# Patient Record
Sex: FEMALE | Race: WHITE | NOT HISPANIC OR LATINO | URBAN - METROPOLITAN AREA
[De-identification: names, ages, dates, MRNs, and addresses within clinical notes are randomized per-mention and may not be internally consistent; named-entity substitution may affect disease eponyms.]

---

## 2017-04-27 ENCOUNTER — TRANSFERRED RECORDS (OUTPATIENT)
Dept: HEALTH INFORMATION MANAGEMENT | Facility: CLINIC | Age: 77
End: 2017-04-27

## 2017-04-27 ENCOUNTER — MEDICAL CORRESPONDENCE (OUTPATIENT)
Dept: HEALTH INFORMATION MANAGEMENT | Facility: CLINIC | Age: 77
End: 2017-04-27

## 2017-10-10 ENCOUNTER — TRANSFERRED RECORDS (OUTPATIENT)
Dept: HEALTH INFORMATION MANAGEMENT | Facility: CLINIC | Age: 77
End: 2017-10-10

## 2017-12-14 ENCOUNTER — TRANSFERRED RECORDS (OUTPATIENT)
Dept: HEALTH INFORMATION MANAGEMENT | Facility: CLINIC | Age: 77
End: 2017-12-14

## 2017-12-22 ENCOUNTER — TRANSFERRED RECORDS (OUTPATIENT)
Dept: HEALTH INFORMATION MANAGEMENT | Facility: CLINIC | Age: 77
End: 2017-12-22

## 2018-01-16 ENCOUNTER — OFFICE VISIT (OUTPATIENT)
Dept: OPHTHALMOLOGY | Facility: CLINIC | Age: 78
End: 2018-01-16
Attending: OPHTHALMOLOGY
Payer: MEDICARE

## 2018-01-16 DIAGNOSIS — H35.3122 NONEXUDATIVE AGE-RELATED MACULAR DEGENERATION, LEFT EYE, INTERMEDIATE DRY STAGE: ICD-10-CM

## 2018-01-16 DIAGNOSIS — H04.123 BILATERAL DRY EYES: ICD-10-CM

## 2018-01-16 DIAGNOSIS — Z96.1 PSEUDOPHAKIA: ICD-10-CM

## 2018-01-16 DIAGNOSIS — H35.3290 EXUDATIVE AGE RELATED MACULAR DEGENERATION (H): Primary | ICD-10-CM

## 2018-01-16 PROCEDURE — 92134 CPTRZ OPH DX IMG PST SGM RTA: CPT | Mod: ZF | Performed by: OPHTHALMOLOGY

## 2018-01-16 PROCEDURE — G0463 HOSPITAL OUTPT CLINIC VISIT: HCPCS | Mod: ZF

## 2018-01-16 RX ORDER — ASCORBIC ACID 125 MG
5 TABLET,CHEWABLE ORAL
COMMUNITY

## 2018-01-16 RX ORDER — DIPHENOXYLATE HYDROCHLORIDE AND ATROPINE SULFATE 2.5; .025 MG/1; MG/1
1 TABLET ORAL
COMMUNITY
Start: 2012-01-26 | End: 2018-06-28

## 2018-01-16 RX ORDER — CEPHRADINE 500 MG
250 CAPSULE ORAL
COMMUNITY
End: 2018-10-23

## 2018-01-16 ASSESSMENT — SLIT LAMP EXAM - LIDS
COMMENTS: NORMAL
COMMENTS: NORMAL

## 2018-01-16 ASSESSMENT — VISUAL ACUITY
METHOD: SNELLEN - LINEAR
OS_SC: 20/30
OD_PH_SC: 20/25
OD_SC+: +2
OS_SC+: -1
OD_SC: 20/30

## 2018-01-16 ASSESSMENT — TONOMETRY
OD_IOP_MMHG: 15
IOP_METHOD: TONOPEN
OS_IOP_MMHG: 12

## 2018-01-16 ASSESSMENT — CONF VISUAL FIELD
OD_NORMAL: 1
OS_NORMAL: 1

## 2018-01-16 ASSESSMENT — EXTERNAL EXAM - LEFT EYE: OS_EXAM: NORMAL

## 2018-01-16 ASSESSMENT — EXTERNAL EXAM - RIGHT EYE: OD_EXAM: NORMAL

## 2018-01-16 NOTE — PROGRESS NOTES
Assessment & Plan      Aminta Cortés is a 78 year old female with the following diagnoses:   1. Exudative age related macular degeneration (H) - Right Eye    2. Nonexudative age-related macular degeneration, left eye, intermediate dry stage    3. Pseudophakia - Both Eyes    4. Bilateral dry eyes         S/P cataract extraction left eye (7/2017) and right eye (12/2017) with Dr. Gabriel at [place]  Right eye S/P multiple avastin (Q4W since June 2017) most recent 12/14/2017   Here to establish and transfer cares closer to home.  Very happy with vision improvement following cataract extraction.  Monthly injections are difficulty to continue in [place].  Concerned with possible systemic HTN secondary to repeat injections.    Discussed stable Exudative Age related macular degeneration right eye   Per review of outside records, plan was to start treat and extend  Would repeat OCT mac in 2 weeks and if still dry start Q6 week series x 3   Doing well following cataract extraction.  K edema nearly resolved  Recheck with refraction in 2 weeks  Continue artificial tears twice a day and as needed      Patient disposition:   Return in about 2 weeks (around 1/30/2018) for VT only, Refraction, OCT Macula.          Attending Physician Attestation:  Complete documentation of historical and exam elements from today's encounter can be found in the full encounter summary report (not reduplicated in this progress note).  I personally obtained the chief complaint(s) and history of present illness.  I confirmed and edited as necessary the review of systems, past medical/surgical history, family history, social history, and examination findings as documented by others; and I examined the patient myself.  I personally reviewed the relevant tests, images, and reports as documented above.  I formulated and edited as necessary the assessment and plan and discussed the findings and management plan with the patient and family. Attending Physician  Image/Tesing Attestation: I personally reviewed the ophthalmic test(s) associated with this encounter, agree with the interpretation(s) as documented by the resident/fellow, and have edited the corresponding report(s) as necessary. . - Tano Mcduffie MD

## 2018-01-16 NOTE — LETTER
1/16/2018       RE: Aminta Cortés  [address] [place] [address]     Dear Dr. Gabriel and Lorraine    I had the pleasure of seeing Ms. Aminta Cortés in the EYE CLINIC at [facility].  To help with the convenience of long term care, she is transferring her cares to our clinic which is closer to home.   Please see a copy of my visit note below.    Assessment & Plan      Aminta Cortés is a 78 year old female with the following diagnoses:   1. Exudative age related macular degeneration (H) - Right Eye    2. Nonexudative age-related macular degeneration, left eye, intermediate dry stage    3. Pseudophakia - Both Eyes    4. Bilateral dry eyes         S/P cataract extraction left eye (7/2017) and right eye (12/2017) with Dr. Gabriel at [place]  Right eye S/P multiple avastin (Q4W since June 2017) most recent 12/14/2017   Here to establish and transfer cares closer to home.  Very happy with vision improvement following cataract extraction.  Monthly injections are difficulty to continue in [place].  Concerned with possible systemic HTN secondary to repeat injections.    Discussed stable Exudative Age related macular degeneration right eye   Per review of outside records, plan was to start treat and extend  Would repeat OCT mac in 2 weeks and if still dry start Q6 week series x 3   Doing well following cataract extraction.  K edema nearly resolved  Recheck with refraction in 2 weeks  Continue artificial tears twice a day and as needed      Patient disposition:   Return in about 2 weeks (around 1/30/2018) for VT only, Refraction, OCT Macula.                  Base Eye Exam     Visual Acuity (Snellen - Linear)      Right Left   Dist sc 20/30 +2 20/30 -1   Dist ph sc 20/25 NI         Tonometry (Tonopen, 9:47 AM)      Right Left   Pressure 15 12         Pupils      Pupils APD   Right PERRL None   Left PERRL None         Visual Fields      Left Right   Result Full Full         Extraocular  Movement      Right Left   Result Full Full         Neuro/Psych     Oriented x3:  Yes    Mood/Affect:  Normal      Dilation     Both eyes:  1.0% Mydriacyl, 2.5% Shabbir Synephrine @ 9:47 AM            Slit Lamp and Fundus Exam     External Exam      Right Left    External Normal Normal      Slit Lamp Exam      Right Left    Lids/Lashes Normal Normal    Conjunctiva/Sclera White and quiet White and quiet    Cornea Clear Clear    Anterior Chamber Deep and quiet Deep and quiet    Iris Dilated Dilated, Iris atrophy    Lens Posterior capsular opacification, Posterior chamber intraocular lens Posterior capsular opacification, Posterior chamber intraocular lens    Vitreous Normal Normal      Fundus Exam      Right Left    Disc Normal Normal    Macula Soft drusen of multiple sizes, no heme, pigmentary changes Soft drusen of multiple sizes, no heme, pigmentary changes    Vessels Normal Normal    Periphery Normal Normal            Refraction     Wearing Rx     Currently using only +3.00 readers after CE/IOL      Manifest Refraction     Defer today - pt happy with readers currently  After dilation, pt's  stated they are due back to [place] at the end of the month for another injection and a final Rx after the most recently CE/IOL - wants to have done here                Again, thank you for allowing me to participate in the care of your patient.      Sincerely,    Tano Mcduffie MD

## 2018-01-16 NOTE — MR AVS SNAPSHOT
After Visit Summary   2018    Aminta Cortés    MRN: [id number]           Patient Information     Date Of Birth          1940        Visit Information        Provider Department      2018 9:00 AM Tano Mcduffie MD Eye Clinic        Today's Diagnoses     Macular degeneration    -  1       Follow-ups after your visit        Who to contact     Please call your clinic at [phone] to:    Ask questions about your health    Make or cancel appointments    Discuss your medicines    Learn about your test results    Speak to your doctor   If you have compliments or concerns about an experience at your clinic, or if you wish to file a complaint, please contact [facility] Patient Relations at [phone] or email us at [email or web address]         Additional Information About Your Visit        MyChart Information     Mimetas is an electronic gateway that provides easy, online access to your medical records. With Mimetas, you can request a clinic appointment, read your test results, renew a prescription or communicate with your care team.     To sign up for [employer] visit the website at [email or web address]   You will be asked to enter the access code listed below, as well as some personal information. Please follow the directions to create your username and password.     Your access code is: [email or web address]  Expires: 2018  6:30 AM     Your access code will  in 90 days. If you need help or a new code, please contact your [facility] or call [phone] for assistance.        Care EveryWhere ID     This is your Care EveryWhere ID. This could be used by other organizations to access your [place] medical records  [id number]         Blood Pressure from Last 3 Encounters:   No data found for BP    Weight from Last 3 Encounters:   No data found for Wt              We Performed the Following     OCT Retina  Spectralis OU (both eyes)        Primary Care Provider Office Phone # Fax #    Cindi Delacruz [phone]       [facility] [address]  [facility] [address]        Equal Access to Services     DREW SAAVEDRA : Jamal carpenter gabby Sobarry, waaxda luqadaha, qaybta kaalmada adejosie, faina cardenas laMaria Estherkael graf. So [facility] [phone].    ATENCIÓN: Si habla español, tiene a engle disposición servicios gratuitos de asistencia lingüística. Llame al [phone].    We comply with applicable federal civil rights laws and [place] laws. We do not discriminate on the basis of race, color, national origin, age, disability, sex, sexual orientation, or gender identity.            Thank you!     Thank you for choosing EYE CLINIC  for your care. Our goal is always to provide you with excellent care. Hearing back from our patients is one way we can continue to improve our services. Please take a few minutes to complete the written survey that you may receive in the mail after your visit with us. Thank you!             Your Updated Medication List - Protect others around you: Learn how to safely use, store and throw away your medicines at www.disposemymeds.org.          This list is accurate as of: 1/16/18 10:15 AM.  Always use your most recent med list.                   Brand Name Dispense Instructions for use Diagnosis    Alpha-Lipoic Acid 200 MG Caps      250 mg        Bilberry 100 MG Caps      Take 100 mg by mouth        CALCIUM PO      Take 500 mg by mouth        Coenzyme Q10-Vitamin E 100-100 MG-UNIT Caps      Take 1 capsule by mouth        * vitamin D3 1000 UNITS Caps      2,500 Units        * D 1000 1000 UNITS Chew   Generic drug:  cholecalciferol      Take 1 tablet by mouth        MAGNESIUM PO      Take 500 mg by mouth        Magnesium Sulfate 70 MG Caps      125 mg        MULTI-VITAMINS Tabs      Take 1 tablet by mouth 3 or 4 times a week        OCUVITE PO       Take 1 tablet by mouth daily Since May 2017        Quercetin 50 MG Tabs      800 mg        Resveratrol 50 MG Caps      200 mg        UNABLE TO FIND      Indications: L-Carnosine -- 500 mg        VITAMIN B COMPLEX PO      Take 1 capsule by mouth        Vitamin K2 100 MCG Caps      5 mg        Zinc Methionate 50 MG Caps      Take 50 mg by mouth        * Notice:  This list has 2 medication(s) that are the same as other medications prescribed for you. Read the directions carefully, and ask your doctor or other care provider to review them with you.

## 2018-01-16 NOTE — NURSING NOTE
"Chief Complaints and History of Present Illnesses   Patient presents with     Consult For     wet macular degeneration in the right eye, dry macular degeneration in the left eye     HPI    Affected eye(s):  Both   Symptoms:     No decreased vision   Floaters (Comment: just a couple were noted post-cataract surgery)   No flashes   No Dryness      Duration:  9 months   Frequency:  Constant       Do you have eye pain now?:  No      Comments:  Pt reports that she had been treated at [place] for monthly injections of Avastin in her right eye - would like to receive these closer to home, if they are needed - 2nd opinion whether they are necessary, as she is happy with her vision as it  Pt had cataract surgery in both eyes last year (left eye in May, right eye in December)  Pt states she is still in awe of her vision since the cataract surgery was just recently done (states the cataracts were pretty dense as she had family reasons for which she had postponed the surgery previously)  Pt believes her eyes have been dry for about 20 years but states she \"doesn't have a calibration on that\" and isn't bothered by dryness today  Pt states the waviness on the Amsler grid shows on the left eye (not on the right eye)    Pt is on the last week or so of her POP drops in the right eye  Pt only occasionally uses AT's    Jacy Munguia COA 9:17 AM January 16, 2018              "

## 2018-01-22 DIAGNOSIS — H35.3211 EXUDATIVE AGE-RELATED MACULAR DEGENERATION OF RIGHT EYE WITH ACTIVE CHOROIDAL NEOVASCULARIZATION (H): ICD-10-CM

## 2018-01-22 DIAGNOSIS — H35.3290 EXUDATIVE SENILE MACULAR DEGENERATION OF RETINA (H): Primary | ICD-10-CM

## 2018-01-30 ENCOUNTER — OFFICE VISIT (OUTPATIENT)
Dept: OPHTHALMOLOGY | Facility: CLINIC | Age: 78
End: 2018-01-30
Attending: OPHTHALMOLOGY
Payer: MEDICARE

## 2018-01-30 DIAGNOSIS — H35.3290 EXUDATIVE SENILE MACULAR DEGENERATION OF RETINA (H): Primary | ICD-10-CM

## 2018-01-30 DIAGNOSIS — H35.3190 NONEXUDATIVE SENILE MACULAR DEGENERATION OF RETINA: ICD-10-CM

## 2018-01-30 PROCEDURE — G0463 HOSPITAL OUTPT CLINIC VISIT: HCPCS | Mod: ZF

## 2018-01-30 PROCEDURE — C9257 BEVACIZUMAB INJECTION: HCPCS | Mod: ZF | Performed by: OPHTHALMOLOGY

## 2018-01-30 PROCEDURE — 67028 INJECTION EYE DRUG: CPT | Mod: RT,ZF | Performed by: OPHTHALMOLOGY

## 2018-01-30 PROCEDURE — 92015 DETERMINE REFRACTIVE STATE: CPT | Mod: GY,ZF

## 2018-01-30 PROCEDURE — 25000128 H RX IP 250 OP 636: Mod: ZF | Performed by: OPHTHALMOLOGY

## 2018-01-30 PROCEDURE — 92134 CPTRZ OPH DX IMG PST SGM RTA: CPT | Mod: ZF | Performed by: OPHTHALMOLOGY

## 2018-01-30 RX ADMIN — Medication 1.25 MG: at 12:15

## 2018-01-30 ASSESSMENT — VISUAL ACUITY
OS_SC+: -2
OS_SC: 20/30
METHOD: SNELLEN - LINEAR
OD_SC: 20/25

## 2018-01-30 ASSESSMENT — CONF VISUAL FIELD
OD_NORMAL: 1
METHOD: COUNTING FINGERS
OS_NORMAL: 1

## 2018-01-30 ASSESSMENT — TONOMETRY
IOP_METHOD: TONOPEN
OS_IOP_MMHG: 15
OD_IOP_MMHG: 14

## 2018-01-30 ASSESSMENT — REFRACTION_MANIFEST
OS_AXIS: 165
OD_AXIS: 005
OD_CYLINDER: +0.75
OS_CYLINDER: +0.50
OS_ADD: +2.75
OD_SPHERE: -0.50
OS_SPHERE: PLANO
OD_ADD: +2.75

## 2018-01-30 NOTE — NURSING NOTE
Chief Complaints and History of Present Illnesses   Patient presents with     Follow Up For     Exudative age related macular degeneration      HPI    Affected eye(s):  Both   Symptoms:        Frequency:  Constant       Do you have eye pain now?:  No      Comments:  States va is the same since last visit  +dry   Decreased floaters  Celia HILLMAN 10:58 AM January 30, 2018

## 2018-01-30 NOTE — MR AVS SNAPSHOT
After Visit Summary   1/30/2018    Aminta Cortés    MRN: [id number]           Patient Information     Date Of Birth          1940        Visit Information        Provider Department      1/30/2018 10:30 AM Tano Mcduffei MD Eye Clinic        Today's Diagnoses     Exudative senile macular degeneration of retina (H)    -  1    Nonexudative senile macular degeneration of retina           Follow-ups after your visit        Your next 10 appointments already scheduled     Mar 13, 2018 10:30 AM CDT   RETURN GENERAL with Tano Mcduffie MD   Eye Clinic ([facility])    [address]  [address]  [address]  [facility] [address]   [phone]              Who to contact     Please call your clinic at [phone] to:    Ask questions about your health    Make or cancel appointments    Discuss your medicines    Learn about your test results    Speak to your doctor   If you have compliments or concerns about an experience at your clinic, or if you wish to file a complaint, please contact [facility] Patient Relations at [phone] or email us at [email or web address]         Additional Information About Your Visit        MyChart Information     ImageShackt gives you secure access to your electronic health record. If you see a primary care provider, you can also send messages to your care team and make appointments. If you have questions, please call your primary care clinic.  If you do not have a primary care provider, please call [phone] and they will assist you.      InfoAssure is an electronic gateway that provides easy, online access to your medical records. With InfoAssure, you can request a clinic appointment, read your test results, renew a prescription or communicate with your care team.     To access your existing account, please contact your [facility] or call [phone] for  assistance.        Care EveryWhere ID     This is your Care EveryWhere ID. This could be used by other organizations to access your [place] medical records  [id number]         Blood Pressure from Last 3 Encounters:   No data found for BP    Weight from Last 3 Encounters:   No data found for Wt              We Performed the Following     OCT Retina Spectralis OU (both eyes)        Primary Care Provider Office Phone # Fax #    Cindi Delacruz [phone]       [facility] [address]  [facility] [address]        Equal Access to Services     DREW SAAVEDRA : Hadii aad ku hadasho Soomaali, waaxda luqadaha, qaybta kaalmada adeegyada, waxay idiin hayaan adeeg ronald greer . So [facility] [phone].    ATENCIÓN: Si habla español, tiene a engle disposición servicios gratuitos de asistencia lingüística. Llame al [phone].    We comply with applicable federal civil rights laws and [place] laws. We do not discriminate on the basis of race, color, national origin, age, disability, sex, sexual orientation, or gender identity.            Thank you!     Thank you for choosing EYE CLINIC  for your care. Our goal is always to provide you with excellent care. Hearing back from our patients is one way we can continue to improve our services. Please take a few minutes to complete the written survey that you may receive in the mail after your visit with us. Thank you!             Your Updated Medication List - Protect others around you: Learn how to safely use, store and throw away your medicines at www.disposemymeds.org.          This list is accurate as of 1/30/18 12:17 PM.  Always use your most recent med list.                   Brand Name Dispense Instructions for use Diagnosis    Alpha-Lipoic Acid 200 MG Caps      250 mg        Bilberry 100 MG Caps      Take 100 mg by mouth        CALCIUM PO      Take 500 mg by mouth        Coenzyme Q10-Vitamin E 100-100 MG-UNIT Caps      Take 1  capsule by mouth        * vitamin D3 1000 UNITS Caps      2,500 Units        * D 1000 1000 UNITS Chew   Generic drug:  cholecalciferol      Take 1 tablet by mouth        MAGNESIUM PO      Take 500 mg by mouth        Magnesium Sulfate 70 MG Caps      125 mg        MULTI-VITAMINS Tabs      Take 1 tablet by mouth 3 or 4 times a week        OCUVITE PO      Take 1 tablet by mouth daily Since May 2017        Quercetin 50 MG Tabs      800 mg        Resveratrol 50 MG Caps      200 mg        UNABLE TO FIND      Indications: L-Carnosine -- 500 mg        VITAMIN B COMPLEX PO      Take 1 capsule by mouth        Vitamin K2 100 MCG Caps      5 mg        Zinc Methionate 50 MG Caps      Take 50 mg by mouth        * Notice:  This list has 2 medication(s) that are the same as other medications prescribed for you. Read the directions carefully, and ask your doctor or other care provider to review them with you.

## 2018-01-30 NOTE — PROGRESS NOTES
Assessment & Plan      Aminta Cortés is a 78 year old female with the following diagnoses:   1. Exudative senile macular degeneration of retina (H)    2. Nonexudative senile macular degeneration of retina         S/P cataract extraction left eye (7/2017) and right eye (12/2017) with Dr. Gabriel at [place]  Right eye S/P multiple avastin (Q4W since June 2017) most recent 12/14/2017   Repeat OCT today (6 weeks from previous injection) has a very subtle increase in intraretinal fluid of the right eye.  Left eye stable and dry    Here to establish and transfer cares closer to home.  Very happy with vision improvement following cataract extraction.  Monthly injections are difficulty to continue in [place].  Concerned with possible systemic HTN secondary to repeat injections.    Discussed stable Exudative Age related macular degeneration right eye   RBA to repeat avastin injection today, right eye.  Plan to see back in 6 weeks with repeat OCT and likely injection.  Injection #1/3 today  Ok to update glasses following cataract extraction; prescription provided today    Patient disposition:   Return in about 6 weeks (around 3/13/2018) for VT only, OCT Macula.          Attending Physician Attestation:  Complete documentation of historical and exam elements from today's encounter can be found in the full encounter summary report (not reduplicated in this progress note).  I personally obtained the chief complaint(s) and history of present illness.  I confirmed and edited as necessary the review of systems, past medical/surgical history, family history, social history, and examination findings as documented by others; and I examined the patient myself.  I personally reviewed the relevant tests, images, and reports as documented above.  I formulated and edited as necessary the assessment and plan and discussed the findings and management plan with the patient and family. Attending Physician Procedure Attestation: I was present  for the entire procedure        . - Tano Mcduffie MD

## 2018-03-13 ENCOUNTER — OFFICE VISIT (OUTPATIENT)
Dept: OPHTHALMOLOGY | Facility: CLINIC | Age: 78
End: 2018-03-13
Attending: OPHTHALMOLOGY
Payer: MEDICARE

## 2018-03-13 ENCOUNTER — TELEPHONE (OUTPATIENT)
Dept: OPHTHALMOLOGY | Facility: CLINIC | Age: 78
End: 2018-03-13

## 2018-03-13 DIAGNOSIS — Z96.1 PSEUDOPHAKIA OF BOTH EYES: Primary | ICD-10-CM

## 2018-03-13 DIAGNOSIS — H35.3231 EXUDATIVE AGE-RELATED MACULAR DEGENERATION OF BOTH EYES WITH ACTIVE CHOROIDAL NEOVASCULARIZATION (H): ICD-10-CM

## 2018-03-13 DIAGNOSIS — H35.3290 EXUDATIVE SENILE MACULAR DEGENERATION OF RETINA (H): Primary | ICD-10-CM

## 2018-03-13 PROCEDURE — 67028 INJECTION EYE DRUG: CPT | Mod: RT,ZF | Performed by: OPHTHALMOLOGY

## 2018-03-13 PROCEDURE — 92134 CPTRZ OPH DX IMG PST SGM RTA: CPT | Mod: ZF | Performed by: OPHTHALMOLOGY

## 2018-03-13 PROCEDURE — G0463 HOSPITAL OUTPT CLINIC VISIT: HCPCS | Mod: 25,ZF

## 2018-03-13 PROCEDURE — 25000128 H RX IP 250 OP 636: Mod: ZF | Performed by: OPHTHALMOLOGY

## 2018-03-13 PROCEDURE — C9257 BEVACIZUMAB INJECTION: HCPCS | Mod: ZF | Performed by: OPHTHALMOLOGY

## 2018-03-13 RX ADMIN — Medication 1.25 MG: at 12:15

## 2018-03-13 ASSESSMENT — VISUAL ACUITY
CORRECTION_TYPE: GLASSES
METHOD: SNELLEN - LINEAR
OS_CC+: -2
OD_CC: 20/25
OS_CC: 20/30

## 2018-03-13 ASSESSMENT — REFRACTION_WEARINGRX
OS_ADD: +2.75
OD_ADD: +2.75
OS_CYLINDER: +0.50
OD_AXIS: 005
OS_AXIS: 165
OD_SPHERE: -0.50
OS_SPHERE: PLANO
OD_CYLINDER: +0.75

## 2018-03-13 ASSESSMENT — TONOMETRY
OD_IOP_MMHG: 11
IOP_METHOD: ICARE
OS_IOP_MMHG: 11

## 2018-03-13 ASSESSMENT — CONF VISUAL FIELD
OD_NORMAL: 1
OS_NORMAL: 1

## 2018-03-13 ASSESSMENT — EXTERNAL EXAM - RIGHT EYE: OD_EXAM: NORMAL

## 2018-03-13 ASSESSMENT — EXTERNAL EXAM - LEFT EYE: OS_EXAM: NORMAL

## 2018-03-13 ASSESSMENT — SLIT LAMP EXAM - LIDS
COMMENTS: NORMAL
COMMENTS: NORMAL

## 2018-03-13 NOTE — NURSING NOTE
Chief Complaints and History of Present Illnesses   Patient presents with     Follow Up For     Exudative senile macular degeneration of retina      HPI    Affected eye(s):  Both   Symptoms:        Duration:  6 weeks   Frequency:  Constant       Do you have eye pain now?:  No      Comments:  Pt. States that she is doing well,  No change in VA BE.  Does have a lot of itching BE.  Joselyn Centeno COT 11:00 AM March 13, 2018

## 2018-03-13 NOTE — PROGRESS NOTES
Patient here for Wet Age related macular degeneration OD and dry Age related macular degeneration OS f/u. Receiving injections OD with treat and extend model.     At last visit also was given updated MRx and patient happy with progressives but having some more difficulty with reading.    Assessment & Plan      Aminta Cortés is a 78 year old female with the following diagnoses:   1. Pseudophakia of both eyes - Both Eyes    2. 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 Gabriel at [place]  Right eye S/P multiple avastin (Q4W since June 2017) at [place]. She transferred her care to us due to easier transportation.     S/p 1 avastin injection last visit 1/30. #1/3 with goal to treat and extend.   6 week visit today with stable OCT right eye  Continue planned 6 week injection series (#2 today)     RBA to repeat avastin injection today, right eye.  Plan to see back in 6 weeks with repeat OCT and likely injection.      Dry Eyes  - Complains of mild pain as she reads.   - Artificial tears QID      Patient disposition:   Return in about 6 weeks (around 4/24/2018) for Follow Up, OCT Macula. and injection (Avastin)         Pa Beltrán M.D.  PGY-2, Ophthalmology    Attending Physician Attestation:  Complete documentation of historical and exam elements from today's encounter can be found in the full encounter summary report (not reduplicated in this progress note).  I personally obtained the chief complaint(s) and history of present illness.  I confirmed and edited as necessary the review of systems, past medical/surgical history, family history, social history, and examination findings as documented by others; and I examined the patient myself.  I personally reviewed the relevant tests, images, and reports as documented above.  I  formulated and edited as necessary the assessment and plan and discussed the findings and management plan with the patient and family.Attending Physician Image/Tesing Attestation: I personally reviewed the ophthalmic test(s) associated with this encounter, agree with the interpretation(s) as documented by the resident/fellow, and have edited the corresponding report(s) as necessary.   Attending Physician Procedure Attestation: I was present for the entire procedure     . - Tano Mcduffie MD

## 2018-03-13 NOTE — MR AVS SNAPSHOT
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      Your next 10 appointments already scheduled     May 01, 2018 10:00 AM CDT   RETURN GENERAL with Tano Mcduffie MD   Eye Clinic ([facility])    [address] [id number] [phone]              Who to contact     Please call your clinic at [phone] to:    Ask questions about your health    Make or cancel appointments    Discuss your medicines    Learn about your test results    Speak to your doctor            Additional Information About Your Visit        MyChart Information     [employer] gives you secure access to your electronic health record. If you see a primary care provider, you can also send messages to your care team and make appointments. If you have questions, please call your primary care clinic.  If you do not have a primary care provider, please call [phone] and they will assist you.      [employer] is an electronic gateway that provides easy, online access to your medical records. With [employer], you can request a clinic appointment, read your test results, renew a prescription or communicate with your care team.     To access your existing account, please contact your [facility] or call [phone] for assistance.        Care EveryWhere ID     This is your Care EveryWhere ID. This  could be used by other organizations to access your [place] medical records  [id number]         Blood Pressure from Last 3 Encounters:   No data found for BP    Weight from Last 3 Encounters:   No data found for Wt              We Performed the Following     OCT Retina Spectralis OU (both eyes)        Primary Care Provider Office Phone # Fax #    Cindi Delacruz [phone]       [facility] [address]  [facility] [address]        Equal Access to Services     DREW SAAVEDRA : Hadii aad ku hadasho Soomaali, waaxda luqadaha, qaybta kaalmada adeegyada, waxay idiin hayaan adeeg kharash la'aan . So [facility] [phone].    ATENCIÓN: Si habla español, tiene a engle disposición servicios gratuitos de asistencia lingüística. [facility] [phone].    We comply with applicable federal civil rights laws and [place] laws. We do not discriminate on the basis of race, color, national origin, age, disability, sex, sexual orientation, or gender identity.            Thank you!     Thank you for choosing EYE CLINIC  for your care. Our goal is always to provide you with excellent care. Hearing back from our patients is one way we can continue to improve our services. Please take a few minutes to complete the written survey that you may receive in the mail after your visit with us. Thank you!             Your Updated Medication List - Protect others around you: Learn how to safely use, store and throw away your medicines at www.disposemymeds.org.          This list is accurate as of 3/13/18 12:45 PM.  Always use your most recent med list.                   Brand Name Dispense Instructions for use Diagnosis    Alpha-Lipoic Acid 200 MG Caps      250 mg        Bilberry 100 MG Caps      Take 100 mg by mouth        CALCIUM PO      Take 500 mg by mouth        Coenzyme Q10-Vitamin E 100-100 MG-UNIT Caps      Take 1 capsule by mouth        * vitamin D3 1000 UNITS Caps      2,500 Units        * D 1000  1000 UNITS Chew   Generic drug:  cholecalciferol      Take 1 tablet by mouth        MAGNESIUM PO      Take 500 mg by mouth        Magnesium Sulfate 70 MG Caps      125 mg        MULTI-VITAMINS Tabs      Take 1 tablet by mouth 3 or 4 times a week        OCUVITE PO      Take 1 tablet by mouth daily Since May 2017        Quercetin 50 MG Tabs      800 mg        Resveratrol 50 MG Caps      200 mg        UNABLE TO FIND      Indications: L-Carnosine -- 500 mg        VITAMIN B COMPLEX PO      Take 1 capsule by mouth        Vitamin K2 100 MCG Caps      5 mg        Zinc Methionate 50 MG Caps      Take 50 mg by mouth        * Notice:  This list has 2 medication(s) that are the same as other medications prescribed for you. Read the directions carefully, and ask your doctor or other care provider to review them with you.

## 2018-05-01 ENCOUNTER — OFFICE VISIT (OUTPATIENT)
Dept: OPHTHALMOLOGY | Facility: CLINIC | Age: 78
End: 2018-05-01
Attending: OPHTHALMOLOGY
Payer: MEDICARE

## 2018-05-01 DIAGNOSIS — H35.3211 EXUDATIVE AGE-RELATED MACULAR DEGENERATION OF RIGHT EYE WITH ACTIVE CHOROIDAL NEOVASCULARIZATION (H): Primary | ICD-10-CM

## 2018-05-01 DIAGNOSIS — Z96.1 PSEUDOPHAKIA OF BOTH EYES: ICD-10-CM

## 2018-05-01 DIAGNOSIS — H04.123 BILATERAL DRY EYES: ICD-10-CM

## 2018-05-01 DIAGNOSIS — H35.3190 NONEXUDATIVE SENILE MACULAR DEGENERATION OF RETINA: ICD-10-CM

## 2018-05-01 PROCEDURE — 92134 CPTRZ OPH DX IMG PST SGM RTA: CPT | Mod: ZF | Performed by: OPHTHALMOLOGY

## 2018-05-01 PROCEDURE — 67028 INJECTION EYE DRUG: CPT | Mod: ZF,RT | Performed by: OPHTHALMOLOGY

## 2018-05-01 PROCEDURE — G0463 HOSPITAL OUTPT CLINIC VISIT: HCPCS | Mod: ZF

## 2018-05-01 PROCEDURE — 92015 DETERMINE REFRACTIVE STATE: CPT | Mod: GY,ZF

## 2018-05-01 PROCEDURE — 25000128 H RX IP 250 OP 636: Mod: ZF | Performed by: OPHTHALMOLOGY

## 2018-05-01 PROCEDURE — C9257 BEVACIZUMAB INJECTION: HCPCS | Mod: ZF | Performed by: OPHTHALMOLOGY

## 2018-05-01 RX ADMIN — Medication 1.25 MG: at 11:18

## 2018-05-01 ASSESSMENT — VISUAL ACUITY
OS_PH_CC: 20/50
OD_CC: 20/25
OD_CC+: -1
METHOD: SNELLEN - LINEAR
OS_CC: 20/60
CORRECTION_TYPE: GLASSES

## 2018-05-01 ASSESSMENT — SLIT LAMP EXAM - LIDS
COMMENTS: NORMAL
COMMENTS: NORMAL

## 2018-05-01 ASSESSMENT — REFRACTION_MANIFEST
OS_CYLINDER: +0.75
OD_CYLINDER: +0.75
OD_AXIS: 005
OD_SPHERE: -0.75
OS_ADD: +2.50
OS_AXIS: 005
OS_SPHERE: -0.50
OD_ADD: +2.50

## 2018-05-01 ASSESSMENT — EXTERNAL EXAM - LEFT EYE: OS_EXAM: NORMAL

## 2018-05-01 ASSESSMENT — CONF VISUAL FIELD
OD_NORMAL: 1
OS_NORMAL: 1

## 2018-05-01 ASSESSMENT — EXTERNAL EXAM - RIGHT EYE: OD_EXAM: NORMAL

## 2018-05-01 ASSESSMENT — TONOMETRY
OS_IOP_MMHG: 12
IOP_METHOD: TONOPEN
OD_IOP_MMHG: 15

## 2018-05-01 ASSESSMENT — REFRACTION_WEARINGRX
OD_ADD: +2.75
OS_ADD: +2.75
OD_CYLINDER: +0.75
OD_SPHERE: -0.50
OS_AXIS: 165
OD_AXIS: 005
OS_SPHERE: +0.00
OS_CYLINDER: +0.50

## 2018-05-01 NOTE — PROGRESS NOTES
Patient here for Wet Age related macular degeneration OD and dry Age related macular degeneration OS f/u. Receiving injections OD with treat and extend model.  Left eye becoming worse.  Difficulty with reading      Assessment & Plan      Aminta Cortés is a 78 year old female with the following diagnoses:   1. 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 Bilateral dry eyes       NVAMD OD, Dry Age related macular degeneration OS  S/P cataract extraction left eye (7/2017) and right eye (12/2017) with Dr. Gabriel at [place]  Right eye S/P multiple avastin (Q4W since June 2017) at [place]. She transferred her care to us due to easier transportation.     S/p 2 avastin injection last visit 1/30. #2/3 with goal to treat and extend.   6 week visit today with stable OCT right eye  Continue planned 6 week injection series (#3 today)     RBA to repeat avastin injection today, right eye.  Plan to see back in 8 weeks with repeat OCT and likely injection.      Dry Eyes  - Complains of mild pain as she reads.   - Artificial tears four times a day   -Recommend low vision consult with Dr. Sandvoal      Patient disposition:   Return in about 2 months (around 7/1/2018) for OCT Macula. and injection (Avastin)       Attending Physician Attestation:  Complete documentation of historical and exam elements from today's encounter can be found in the full encounter summary report (not reduplicated in this progress note).  I personally obtained the chief complaint(s) and history of present illness.  I confirmed and edited as necessary the review of systems, past medical/surgical history, family history, social history, and examination findings as documented by others; and I examined the patient myself.  I personally reviewed the relevant tests, images, and reports as documented  above.  I formulated and edited as necessary the assessment and plan and discussed the findings and management plan with the patient and family.Attending Physician Procedure Attestation: I was present for the entire procedure      - Tano Mcduffie MD

## 2018-05-01 NOTE — MR AVS SNAPSHOT
After Visit Summary   5/1/2018    Aminta Cortés    MRN: [id number]           Patient Information     Date Of Birth          1940        Visit Information        Provider Department      5/1/2018 10:00 AM Tano Mcduffie MD [facility]        Today's Diagnoses     Exudative senile macular degeneration of retina (H)    -  1    Exudative age-related macular degeneration of right eye with active choroidal neovascularization (H)            Follow-ups after your visit        Follow-up notes from your care team     Return in about 2 months (around 7/1/2018) for OCT Macula.      Your next 10 appointments already scheduled     May 09, 2018 10:20 AM CDT   (Arrive by 10:05 AM)   New Low Vision with Joe Sandoval [facility] Ophthalmology ([facility] [place])    [address]  [address] [facility] [address] [phone]            Jul 26, 2018  9:45 AM CDT   RETURN GENERAL with Tano Mcduffie MD   Eye Clinic ([facility])    [address] [address] [phone]              Who to contact     Please call your clinic at [phone] to:    Ask questions about your health    Make or cancel appointments    Discuss your medicines    Learn about your test results    Speak to your doctor            Additional Information About Your Visit        MyChart Information     [employer] gives you secure access to your electronic health record. If you see a primary care provider, you can also send messages to your care team and make appointments. If you have questions, please call your primary care clinic.  If you do not have a primary care provider, please call [phone] and they will assist you.      [employer] is an electronic gateway that provides easy, online access to your medical records. With [employer], you can request a clinic appointment, read your test results, renew a prescription or  communicate with your care team.     To access your existing account, please contact your [facility] or call [phone] for assistance.        Care EveryWhere ID     This is your Care EveryWhere ID. This could be used by other organizations to access your [place] medical records  [id number]         Blood Pressure from Last 3 Encounters:   No data found for BP    Weight from Last 3 Encounters:   No data found for Wt              We Performed the Following     OCT Retina Spectralis OU (both eyes)        Primary Care Provider Office Phone # Fax #    Cindi Delacruz [phone]       [facility] [address]  [facility] [address]        Equal Access to Services     DREW SAAVEDRA : Hadii aad ku hadasho Soomaali, waaxda luqadaha, qaybta kaalmada adeegyada, waxay emaniin keshan jovita greer . So [facility] [phone].    ATENCIÓN: Si habla español, tiene a engle disposición servicios gratuitos de asistencia lingüística. Llame al [phone].    We comply with applicable federal civil rights laws and [place] laws. We do not discriminate on the basis of race, color, national origin, age, disability, sex, sexual orientation, or gender identity.            Thank you!     Thank you for choosing EYE CLINIC  for your care. Our goal is always to provide you with excellent care. Hearing back from our patients is one way we can continue to improve our services. Please take a few minutes to complete the written survey that you may receive in the mail after your visit with us. Thank you!             Your Updated Medication List - Protect others around you: Learn how to safely use, store and throw away your medicines at www.disposemymeds.org.          This list is accurate as of 5/1/18 11:48 AM.  Always use your most recent med list.                   Brand Name Dispense Instructions for use Diagnosis    Alpha-Lipoic Acid 200 MG Caps      250 mg         Bilberry 100 MG Caps      Take 100 mg by mouth        CALCIUM PO      Take 500 mg by mouth        Coenzyme Q10-Vitamin E 100-100 MG-UNIT Caps      Take 1 capsule by mouth        * vitamin D3 1000 units Caps      2,500 Units        * D 1000 1000 units Chew   Generic drug:  cholecalciferol      Take 1 tablet by mouth        MAGNESIUM PO      Take 500 mg by mouth        Magnesium Sulfate 70 MG Caps      125 mg        MULTI-VITAMINS Tabs      Take 1 tablet by mouth 3 or 4 times a week        OCUVITE PO      Take 1 tablet by mouth daily Since May 2017        Quercetin 50 MG Tabs      800 mg        Resveratrol 50 MG Caps      200 mg        UNABLE TO FIND      Indications: L-Carnosine -- 500 mg        VITAMIN B COMPLEX PO      Take 1 capsule by mouth        Vitamin K2 100 MCG Caps      5 mg        Zinc Methionate 50 MG Caps      Take 50 mg by mouth        * Notice:  This list has 2 medication(s) that are the same as other medications prescribed for you. Read the directions carefully, and ask your doctor or other care provider to review them with you.

## 2018-05-01 NOTE — NURSING NOTE
Chief Complaints and History of Present Illnesses   Patient presents with     Macular Degeneration Follow Up     HPI    Last Eye Exam:  3/13/18   Affected eye(s):  Both   Symptoms:     Blurred vision   Decreased vision (Comment: notice while reading, LE. )      Duration:  6 weeks   Frequency:  Constant       Do you have eye pain now?:  Yes   Location:  OD      Comments:  Here for Injection only RE. Notes that she had a lot of pain after last injection, lasted 3 days. Very tender to touch. SARIAH PONCE, COA 10:53 AM 05/01/2018

## 2018-05-09 ENCOUNTER — OFFICE VISIT (OUTPATIENT)
Dept: OPHTHALMOLOGY | Facility: CLINIC | Age: 78
End: 2018-05-09
Payer: MEDICARE

## 2018-05-09 DIAGNOSIS — H35.3211 EXUDATIVE AGE-RELATED MACULAR DEGENERATION OF RIGHT EYE WITH ACTIVE CHOROIDAL NEOVASCULARIZATION (H): ICD-10-CM

## 2018-05-09 DIAGNOSIS — H52.4 PRESBYOPIA: Primary | ICD-10-CM

## 2018-05-09 ASSESSMENT — CONF VISUAL FIELD
OD_NORMAL: 1
OS_NORMAL: 1

## 2018-05-09 ASSESSMENT — REFRACTION_MANIFEST
OD_AXIS: 165
OS_SPHERE: -1.25
OS_CYLINDER: +0.75
OD_ADD: +3.00
OD_SPHERE: -0.50
OS_ADD: +3.00
OS_AXIS: 005
OD_CYLINDER: +0.50

## 2018-05-09 ASSESSMENT — VISUAL ACUITY
METHOD: SNELLEN - LINEAR
CORRECTION_TYPE: GLASSES
OS_CC: 20/50
OD_CC: 20/25

## 2018-05-09 ASSESSMENT — EXTERNAL EXAM - LEFT EYE: OS_EXAM: NORMAL

## 2018-05-09 ASSESSMENT — SLIT LAMP EXAM - LIDS
COMMENTS: NORMAL
COMMENTS: NORMAL

## 2018-05-09 ASSESSMENT — EXTERNAL EXAM - RIGHT EYE: OD_EXAM: NORMAL

## 2018-05-09 NOTE — PROGRESS NOTES
Assessment/Plan  (H52.4) Presbyopia  (primary encounter diagnosis)  Comment: BCVA OD: 20/20-, OS: 20/40  Plan: REFRACTION [42571]        Discussed findings with patient in detail. Patient's primary concern for easier reading was addressed with increasing her near add from +2.50 to +3.00. This Rx was preferred by patient when trial-framed. Advised patient that additional services are available for patient at this facility if her vision changes in the future. Patient is already taking AREDS2 supplements to potentially slow progression. Blue-light and UV-filtering lenses were discussed and encouraged to be worn by patient. Adequate lighting was encouraged, but patient may want to take measures to avoid additional blue-light exposure such as adjusting her screen brightness etc.     (H35.2420) Exudative age-related macular degeneration of right eye with active choroidal neovascularization (H)   Comment: Patient currently under care of Dr. Mcduffie.   Plan: Patient should continue following Dr. Mcduffie's recommendations. She has been receiving Avastin injections OD and is scheduled to return for a repeat examination in about another 6 weeks. Patient should RTC with any sudden vision changes, especially new flashes/floaters/curtain over vision or new/worsening distortion.       Complete documentation of historical and exam elements from today's encounter can  be found in the full encounter summary report (not reduplicated in this progress  note). I personally obtained the chief complaint(s) and history of present illness. I  confirmed and edited as necessary the review of systems, past medical/surgical  history, family history, social history, and examination findings as documented by  others; and I examined the patient myself. I personally reviewed the relevant tests,  images, and reports as documented above. I formulated and edited as necessary the  assessment and plan and discussed the findings and management plan with  the  patient and family.    Joe Sandoval, OD

## 2018-05-09 NOTE — MR AVS SNAPSHOT
After Visit Summary   5/9/2018    Aminta Cortés    MRN: [id number]           Patient Information     Date Of Birth          1940        Visit Information        Provider Department      5/9/2018 10:20 AM Joe Sandoval, ABRAHAN M [facility]        Today's Diagnoses     Presbyopia    -  1    Exudative age-related macular degeneration of right eye with active choroidal neovascularization (H)            Follow-ups after your visit        Follow-up notes from your care team     Return in about 1 year (around 5/9/2019) for Refraction.      Your next 10 appointments already scheduled     Jun 28, 2018  1:15 PM CDT   RETURN GENERAL with Taon Mcduffie MD   Eye Clinic ([facility])    [address]  [facility] [address] [place] [id number] [phone]              Who to contact     Please call your clinic at [phone] to:    Ask questions about your health    Make or cancel appointments    Discuss your medicines    Learn about your test results    Speak to your doctor            Additional Information About Your Visit        MyChart Information     [employer] gives you secure access to your electronic health record. If you see a primary care provider, you can also send messages to your care team and make appointments. If you have questions, please call your primary care clinic.  If you do not have a primary care provider, please call [phone] and they will assist you.      [employer] is an electronic gateway that provides easy, online access to your medical records. With [employer] you can request a clinic appointment, read your test results, renew a prescription or communicate with your care team.     To access your existing account, please contact your [facility] or call [phone] for assistance.        Care EveryWhere ID     This is your Care EveryWhere ID. This could be used by other organizations to  access your [place] medical records  [id number]         Blood Pressure from Last 3 Encounters:   No data found for BP    Weight from Last 3 Encounters:   No data found for Wt              We Performed the Following     REFRACTION [18299]        Primary Care Provider Office Phone # Fax #    Cindi Delacruz [phone]       [facility] [address]  [facility] [address]        Equal Access to Services     DREW SAAVEDRA : Hadii aad ku hadasho Soomaali, waaxda luqadaha, qaybta kaalmada adeegyada, waxay idiin hayaan adeeg ronald lalaurenn . So [facility] [phone].    ATENCIÓN: Si habla espmaverick, tiene a engle disposición servicios gratuitos de asistencia lingüística. Eliseoame al [phone].    We comply with applicable federal civil rights laws and [place] laws. We do not discriminate on the basis of race, color, national origin, age, disability, sex, sexual orientation, or gender identity.            Thank you!     Thank you for choosing [facility] OPHTHALMOLOGY  for your care. Our goal is always to provide you with excellent care. Hearing back from our patients is one way we can continue to improve our services. Please take a few minutes to complete the written survey that you may receive in the mail after your visit with us. Thank you!             Your Updated Medication List - Protect others around you: Learn how to safely use, store and throw away your medicines at www.disposemymeds.org.          This list is accurate as of 5/9/18 11:47 AM.  Always use your most recent med list.                   Brand Name Dispense Instructions for use Diagnosis    Alpha-Lipoic Acid 200 MG Caps      250 mg        Bilberry 100 MG Caps      Take 100 mg by mouth        CALCIUM PO      Take 500 mg by mouth        Coenzyme Q10-Vitamin E 100-100 MG-UNIT Caps      Take 1 capsule by mouth        * vitamin D3 1000 units Caps      2,500 Units        * D 1000 1000 units Chew   Generic drug:   cholecalciferol      Take 1 tablet by mouth        MAGNESIUM PO      Take 500 mg by mouth        Magnesium Sulfate 70 MG Caps      125 mg        MULTI-VITAMINS Tabs      Take 1 tablet by mouth 3 or 4 times a week        OCUVITE PO      Take 1 tablet by mouth daily Since May 2017        Quercetin 50 MG Tabs      800 mg        Resveratrol 50 MG Caps      200 mg        UNABLE TO FIND      Indications: L-Carnosine -- 500 mg        VITAMIN B COMPLEX PO      Take 1 capsule by mouth        Vitamin K2 100 MCG Caps      5 mg        Zinc Methionate 50 MG Caps      Take 50 mg by mouth        * Notice:  This list has 2 medication(s) that are the same as other medications prescribed for you. Read the directions carefully, and ask your doctor or other care provider to review them with you.

## 2018-05-09 NOTE — LETTER
5/9/2018      RE: Aminta Cortés  [address] [facility] [address]       Assessment/Plan  (H52.4) Presbyopia  (primary encounter diagnosis)  Comment: BCVA OD: 20/20-, OS: 20/40  Plan: REFRACTION [46132]        Discussed findings with patient in detail. Patient's primary concern for easier reading was addressed with increasing her near add from +2.50 to +3.00. This Rx was preferred by patient when trial-framed. Advised patient that additional services are available for patient at this facility if her vision changes in the future. Patient is already taking AREDS2 supplements to potentially slow progression. Blue-light and UV-filtering lenses were discussed and encouraged to be worn by patient. Adequate lighting was encouraged, but patient may want to take measures to avoid additional blue-light exposure such as adjusting her screen brightness etc.     (H30.3819) Exudative age-related macular degeneration of right eye with active choroidal neovascularization (H)   Comment: Patient currently under care of Dr. Mcduffie.   Plan: Patient should continue following Dr. Mcduffie's recommendations. She has been receiving Avastin injections OD and is scheduled to return for a repeat examination in about another 6 weeks. Patient should RTC with any sudden vision changes, especially new flashes/floaters/curtain over vision or new/worsening distortion.       Complete documentation of historical and exam elements from today's encounter can  be found in the full encounter summary report (not reduplicated in this progress  note). I personally obtained the chief complaint(s) and history of present illness. I  confirmed and edited as necessary the review of systems, past medical/surgical  history, family history, social history, and examination findings as documented by  others; and I examined the patient myself. I personally reviewed the relevant tests,  images, and reports as documented above. I formulated and edited as  necessary the  assessment and plan and discussed the findings and management plan with the  patient and family.    Joe Sandoval OD

## 2018-06-28 ENCOUNTER — OFFICE VISIT (OUTPATIENT)
Dept: OPHTHALMOLOGY | Facility: CLINIC | Age: 78
End: 2018-06-28
Attending: OPHTHALMOLOGY
Payer: MEDICARE

## 2018-06-28 DIAGNOSIS — H35.3190 NONEXUDATIVE SENILE MACULAR DEGENERATION OF RETINA: ICD-10-CM

## 2018-06-28 DIAGNOSIS — H35.3211 EXUDATIVE AGE-RELATED MACULAR DEGENERATION OF RIGHT EYE WITH ACTIVE CHOROIDAL NEOVASCULARIZATION (H): Primary | ICD-10-CM

## 2018-06-28 DIAGNOSIS — H35.3211 EXUDATIVE AGE-RELATED MACULAR DEGENERATION OF RIGHT EYE WITH ACTIVE CHOROIDAL NEOVASCULARIZATION (H): ICD-10-CM

## 2018-06-28 DIAGNOSIS — Z96.1 PSEUDOPHAKIA OF BOTH EYES: ICD-10-CM

## 2018-06-28 DIAGNOSIS — H04.123 BILATERAL DRY EYES: ICD-10-CM

## 2018-06-28 PROCEDURE — G0463 HOSPITAL OUTPT CLINIC VISIT: HCPCS | Mod: 25,ZF

## 2018-06-28 PROCEDURE — 92134 CPTRZ OPH DX IMG PST SGM RTA: CPT | Mod: ZF | Performed by: OPHTHALMOLOGY

## 2018-06-28 PROCEDURE — C9257 BEVACIZUMAB INJECTION: HCPCS | Mod: ZF | Performed by: OPHTHALMOLOGY

## 2018-06-28 PROCEDURE — 25000128 H RX IP 250 OP 636: Mod: ZF | Performed by: OPHTHALMOLOGY

## 2018-06-28 PROCEDURE — 67028 INJECTION EYE DRUG: CPT | Mod: ZF,RT | Performed by: OPHTHALMOLOGY

## 2018-06-28 RX ADMIN — Medication 1.25 MG: at 15:05

## 2018-06-28 ASSESSMENT — VISUAL ACUITY
OS_PH_CC: 20/40-1
OS_CC: 20/50
OD_CC+: -1
OD_CC: 20/20
OS_CC+: +2
CORRECTION_TYPE: GLASSES
METHOD: SNELLEN - LINEAR

## 2018-06-28 ASSESSMENT — REFRACTION_WEARINGRX
OD_ADD: +3.00
OS_CYLINDER: +0.75
OD_AXIS: 163
OS_ADD: +3.00
OS_SPHERE: -1.00
OS_AXIS: 0045
SPECS_TYPE: PAL
OD_SPHERE: -0.25
OD_CYLINDER: +0.50

## 2018-06-28 ASSESSMENT — CONF VISUAL FIELD
METHOD: COUNTING FINGERS
OS_INFERIOR_TEMPORAL_RESTRICTION: 3
OD_NORMAL: 1

## 2018-06-28 ASSESSMENT — EXTERNAL EXAM - LEFT EYE: OS_EXAM: NORMAL

## 2018-06-28 ASSESSMENT — CUP TO DISC RATIO
OS_RATIO: 0.2
OD_RATIO: 0.2

## 2018-06-28 ASSESSMENT — EXTERNAL EXAM - RIGHT EYE: OD_EXAM: NORMAL

## 2018-06-28 ASSESSMENT — TONOMETRY
IOP_METHOD: ICARE
OS_IOP_MMHG: 10
OD_IOP_MMHG: 11

## 2018-06-28 ASSESSMENT — SLIT LAMP EXAM - LIDS
COMMENTS: NORMAL
COMMENTS: NORMAL

## 2018-06-28 NOTE — PROGRESS NOTES
Patient here for Wet Age related macular degeneration OD and dry Age related macular degeneration OS f/u. Receiving injections OD with treat and extend model.  8 weeks since last treatment.  Overall stable per patient    Assessment & Plan      Aminta Cortés is a 78 year old female with the following diagnoses:   1. Exudative age-related macular degeneration of right eye with active choroidal neovascularization (H)  - Right Eye    2. 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 Bilateral dry eyes       NVAMD OD, Dry Age related macular degeneration OS  S/P cataract extraction left eye (7/2017) and right eye (12/2017) with Dr. Gabriel at [place]  Now S/P 3 injections at 6 week interval.  Doing well.  No evidence of worsening today  Will continue to extend treatment.  Plan for series of 3 at 8 week intervals - avastin   RBA to repeat avastin injection today, right eye.      Appreciate low vision consult.  Artificial tears and glasses to continue  AREDS and Amsler reviewed    Patient disposition:   Return in about 2 months (around 8/28/2018) for VT only; Avastin RIGHT. and injection (Avastin)      Attending Physician Attestation:  Complete documentation of historical and exam elements from today's encounter can be found in the full encounter summary report (not reduplicated in this progress note).  I personally obtained the chief complaint(s) and history of present illness.  I confirmed and edited as necessary the review of systems, past medical/surgical history, family history, social history, and examination findings as documented by others; and I examined the patient myself.  I personally reviewed the relevant tests, images, and reports as documented above.  I formulated and edited as necessary the assessment and plan and discussed the findings and management plan with the patient and family. . - Tano Mcduffie MD

## 2018-06-28 NOTE — NURSING NOTE
Chief Complaints and History of Present Illnesses   Patient presents with     Follow Up For     2 month follow up age related mac degen RE     HPI    Last Eye Exam:  5/1/18   Affected eye(s):  Right   Symptoms:     Floaters (Comment: RE, central, constantly)   No flashes   No redness   No tearing   No Dryness   Itching (Comment: BE, increased at bedtime)      Duration:  2 months   Frequency:  Constant       Do you have eye pain now?:  No      Comments:  Pt is hard of hearing  Pt states vision with new prescription glasses is much better.     Pt taking Zaditor (antihistamine eye drops) for itching in RE PRN. Pt states it was prescribed to her prior but not sure if she should be using.     Shilpa BLACKWELL June 28, 2018 1:37 PM  Above info and exam reviewed by me  Jacy Munguia COA 2:43 PM June 28, 2018

## 2018-06-28 NOTE — MR AVS SNAPSHOT
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      Your next 10 appointments already scheduled     Aug 21, 2018  9:45 AM CDT   RETURN GENERAL with Tano Mcduffie MD   Eye Clinic ([facility])    [address] [address] [phone]              Who to contact     Please call your clinic at [phone] to:    Ask questions about your health    Make or cancel appointments    Discuss your medicines    Learn about your test results    Speak to your doctor            Additional Information About Your Visit        [employer] Information     [employer] gives you secure access to your electronic health record. If you see a primary care provider, you can also send messages to your care team and make appointments. If you have questions, please call your primary care clinic.  If you do not have a primary care provider, please call [phone] and they will assist you.      [employer] is an electronic gateway that provides easy, online access to your medical records. With [employer], you can request a clinic appointment, read your test results, renew a prescription or communicate with your care team.      To access your existing account, please contact your [facility] or call [phone] for assistance.        Care EveryWhere ID     This is your Care EveryWhere ID. This could be used by other organizations to access your [place] medical records  [id number]         Blood Pressure from Last 3 Encounters:   No data found for BP    Weight from Last 3 Encounters:   No data found for Wt              We Performed the Following     Avastin (Bevacizumab) 1.25MG Intravitreal Injection OD (right eye)     OCT Retina Spectralis OU (both eyes)        Primary Care Provider Office Phone # Fax #    Cindi Delacruz [phone]       [facility] [address]  [facility] [address]        Equal Access to Services     DREW SAAVEDRA : Hadii catarino greeno Sobarry, waaxda luqadaha, qaybta kaalmada adeegyada, faina graf. So [facility] [phone].    ATENCIÓN: Si habla español, tiene a engle disposición servicios gratuitos de asistencia lingüística. Llame al [phone].    We comply with applicable federal civil rights laws and [place] laws. We do not discriminate on the basis of race, color, national origin, age, disability, sex, sexual orientation, or gender identity.            Thank you!     Thank you for choosing EYE CLINIC  for your care. Our goal is always to provide you with excellent care. Hearing back from our patients is one way we can continue to improve our services. Please take a few minutes to complete the written survey that you may receive in the mail after your visit with us. Thank you!             Your Updated Medication List - Protect others around you: Learn how to safely use, store and throw away your medicines at www.disposemymeds.org.          This list is accurate as of 6/28/18  3:09 PM.  Always use your most recent med list.                   Brand Name Dispense Instructions for use Diagnosis    Alpha-Lipoic  Acid 200 MG Caps      250 mg        Bilberry 100 MG Caps      Take 100 mg by mouth        Coenzyme Q10-Vitamin E 100-100 MG-UNIT Caps      Take 1 capsule by mouth        MAGNESIUM PO      Take 500 mg by mouth        OCUVITE PO      Take 1 tablet by mouth daily Since May 2017        Quercetin 50 MG Tabs      800 mg        Resveratrol 50 MG Caps      200 mg        UNABLE TO FIND      Indications: L-Carnosine -- 500 mg        VITAMIN B COMPLEX PO      Take 1 capsule by mouth        vitamin D3 1000 units Caps      2,500 Units        Vitamin K2 100 MCG Caps      5 mg        Zinc Methionate 50 MG Caps      Take 50 mg by mouth

## 2018-08-21 ENCOUNTER — OFFICE VISIT (OUTPATIENT)
Dept: OPHTHALMOLOGY | Facility: CLINIC | Age: 78
End: 2018-08-21
Attending: OPHTHALMOLOGY
Payer: MEDICARE

## 2018-08-21 DIAGNOSIS — H35.3290 EXUDATIVE SENILE MACULAR DEGENERATION OF RETINA (H): Primary | ICD-10-CM

## 2018-08-21 PROCEDURE — 67028 INJECTION EYE DRUG: CPT | Mod: ZF,RT | Performed by: OPHTHALMOLOGY

## 2018-08-21 PROCEDURE — 25000128 H RX IP 250 OP 636: Mod: ZF | Performed by: OPHTHALMOLOGY

## 2018-08-21 PROCEDURE — C9257 BEVACIZUMAB INJECTION: HCPCS | Mod: ZF | Performed by: OPHTHALMOLOGY

## 2018-08-21 RX ADMIN — Medication 1.25 MG: at 11:08

## 2018-08-21 ASSESSMENT — VISUAL ACUITY
METHOD: SNELLEN - LINEAR
CORRECTION_TYPE: GLASSES
OS_CC: 20/40
OD_CC: 20/20-2

## 2018-08-21 ASSESSMENT — REFRACTION_WEARINGRX
OD_SPHERE: -0.25
OS_AXIS: 0045
SPECS_TYPE: PAL
OD_CYLINDER: +0.50
OS_SPHERE: -1.00
OS_ADD: +3.00
OS_CYLINDER: +0.75
OD_ADD: +3.00
OD_AXIS: 163

## 2018-08-21 ASSESSMENT — TONOMETRY
OS_IOP_MMHG: 10
OD_IOP_MMHG: 08
IOP_METHOD: TONOPEN

## 2018-08-21 NOTE — MR AVS SNAPSHOT
After Visit Summary   8/21/2018    Aminta Cortés    MRN: [id number]           Patient Information     Date Of Birth          1940        Visit Information        Provider Department      8/21/2018 9:45 AM Tano Mcduffie MD [facility]         Follow-ups after your visit        Your next 10 appointments already scheduled     Oct 23, 2018 10:00 AM CDT   RETURN GENERAL with Tano Mcduffie MD   Eye Clinic ([facility])    [address] [address]   [phone]            Dec 18, 2018 10:00 AM CST   RETURN GENERAL with Tano Mcduffie MD   Eye Clinic ([facility])    [address] [address] [phone]              Who to contact     Please call your clinic at [phone] to:    Ask questions about your health    Make or cancel appointments    Discuss your medicines    Learn about your test results    Speak to your doctor            Additional Information About Your Visit        [employer] Information     [employer] gives you secure access to your electronic health record. If you see a primary care provider, you can also send messages to your care team and make appointments. If you have questions, please call your primary care clinic.  If you do not have a primary care provider, please call [phone] and they will assist you.      [employer] is an electronic gateway that provides easy, online access to your medical records. With [employer], you can request a clinic appointment, read your test results, renew a prescription or communicate with your care team.     To access your existing account, please contact your [facility] or call [phone] for assistance.        Care EveryWhere ID     This is your Care EveryWhere ID. This could be used by other organizations to access your [facility]  records  [id number]         Blood Pressure from Last 3 Encounters:   No data found for BP    Weight from Last 3 Encounters:   No data found for Wt              Today, you had the following     No orders found for display       Primary Care Provider Office Phone # Fax #    Cindi Delarcuz [phone]       [facility] [address]  [facility] [address]        Equal Access to Services     DREW SAAVEDRA : Hadii aad ku hadasho Soomaali, waaxda luqadaha, qaybta kaalmada adeegyada, waxay idiin hayaan adeeg kharash la'aan ah. So [facility] [phone].    ATENCIÓN: Si habvicky bowman, tiene a engle disposición servicios gratuitos de asistencia lingüística. Llame al [phone].    We comply with applicable federal civil rights laws and [place] laws. We do not discriminate on the basis of race, color, national origin, age, disability, sex, sexual orientation, or gender identity.            Thank you!     Thank you for choosing EYE CLINIC  for your care. Our goal is always to provide you with excellent care. Hearing back from our patients is one way we can continue to improve our services. Please take a few minutes to complete the written survey that you may receive in the mail after your visit with us. Thank you!             Your Updated Medication List - Protect others around you: Learn how to safely use, store and throw away your medicines at www.disposemymeds.org.          This list is accurate as of 8/21/18 11:05 AM.  Always use your most recent med list.                   Brand Name Dispense Instructions for use Diagnosis    Alpha-Lipoic Acid 200 MG Caps      250 mg        Bilberry 100 MG Caps      Take 100 mg by mouth        Coenzyme Q10-Vitamin E 100-100 MG-UNIT Caps      Take 1 capsule by mouth        MAGNESIUM PO      Take 500 mg by mouth        OCUVITE PO      Take 1 tablet by mouth daily Since May 2017        Quercetin 50 MG Tabs      800 mg        Resveratrol 50 MG Caps       200 mg        UNABLE TO FIND      Indications: L-Carnosine -- 500 mg        VITAMIN B COMPLEX PO      Take 1 capsule by mouth        vitamin D3 1000 units Caps      2,500 Units        Vitamin K2 100 MCG Caps      5 mg        Zinc Methionate 50 MG Caps      Take 50 mg by mouth

## 2018-08-22 NOTE — PROGRESS NOTES
Patient here for Wet Age related macular degeneration OD and dry Age related macular degeneration OS f/u. Receiving injections OD with treat and extend model.  8 weeks since last treatment.  Overall stable per patient    Assessment & Plan      Aminta Cortés is a 78 year old female with the following diagnoses:   1. 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 Gabriel at [place]  Continuing treat and extend, did well with 6 week interval.    #1 of 3 at 8 week intervals - avastin   RBA to repeat avastin injection today, right eye.    AREBLADIMIR and Autumn reviewed    Patient disposition:   Return in about 2 months (around 10/21/2018) for VT only; Avastin right. and injection (Avastin)      Attending Physician Attestation:  Complete documentation of historical and exam elements from today's encounter can be found in the full encounter summary report (not reduplicated in this progress note).  I personally obtained the chief complaint(s) and history of present illness.  I confirmed and edited as necessary the review of systems, past medical/surgical history, family history, social history, and examination findings as documented by others; and I examined the patient myself.  I personally reviewed the relevant tests, images, and reports as documented above.  I formulated and edited as necessary the assessment and plan and discussed the findings and management plan with the patient and family. . - Tano Mcduffie MD

## 2018-10-23 ENCOUNTER — OFFICE VISIT (OUTPATIENT)
Dept: OPHTHALMOLOGY | Facility: CLINIC | Age: 78
End: 2018-10-23
Attending: OPHTHALMOLOGY
Payer: MEDICARE

## 2018-10-23 DIAGNOSIS — H35.3123 ADVANCED ATROPHIC NONEXUDATIVE AGE-RELATED MACULAR DEGENERATION OF LEFT EYE WITHOUT SUBFOVEAL INVOLVEMENT: ICD-10-CM

## 2018-10-23 DIAGNOSIS — H35.3212 EXUDATIVE AGE-RELATED MACULAR DEGENERATION OF RIGHT EYE WITH INACTIVE CHOROIDAL NEOVASCULARIZATION (H): Primary | ICD-10-CM

## 2018-10-23 PROCEDURE — G0463 HOSPITAL OUTPT CLINIC VISIT: HCPCS | Mod: 25,ZF

## 2018-10-23 PROCEDURE — 92134 CPTRZ OPH DX IMG PST SGM RTA: CPT | Mod: ZF | Performed by: OPHTHALMOLOGY

## 2018-10-23 PROCEDURE — 67028 INJECTION EYE DRUG: CPT | Mod: ZF | Performed by: OPHTHALMOLOGY

## 2018-10-23 PROCEDURE — 25000128 H RX IP 250 OP 636: Mod: ZF | Performed by: OPHTHALMOLOGY

## 2018-10-23 PROCEDURE — C9257 BEVACIZUMAB INJECTION: HCPCS | Mod: ZF | Performed by: OPHTHALMOLOGY

## 2018-10-23 RX ADMIN — Medication 1.25 MG: at 10:49

## 2018-10-23 ASSESSMENT — REFRACTION_WEARINGRX
OD_AXIS: 163
OS_AXIS: 0045
OD_CYLINDER: +0.50
OD_SPHERE: -0.25
OD_ADD: +3.00
OS_SPHERE: -1.00
OS_ADD: +3.00
OS_CYLINDER: +0.75
SPECS_TYPE: PAL

## 2018-10-23 ASSESSMENT — VISUAL ACUITY
OD_CC: 20/30
OD_PH_CC: 20/25-2
CORRECTION_TYPE: GLASSES
OD_CC+: -1
METHOD: SNELLEN - LINEAR

## 2018-10-23 ASSESSMENT — SLIT LAMP EXAM - LIDS
COMMENTS: NORMAL
COMMENTS: NORMAL

## 2018-10-23 ASSESSMENT — EXTERNAL EXAM - LEFT EYE: OS_EXAM: NORMAL

## 2018-10-23 ASSESSMENT — CUP TO DISC RATIO
OS_RATIO: 0.2
OD_RATIO: 0.2

## 2018-10-23 ASSESSMENT — TONOMETRY
IOP_METHOD: TONOPEN
OS_IOP_MMHG: 12
OD_IOP_MMHG: 12

## 2018-10-23 ASSESSMENT — EXTERNAL EXAM - RIGHT EYE: OD_EXAM: NORMAL

## 2018-10-23 ASSESSMENT — CONF VISUAL FIELD
METHOD: COUNTING FINGERS
OS_INFERIOR_NASAL_RESTRICTION: 3
OD_NORMAL: 1

## 2018-10-23 NOTE — MR AVS SNAPSHOT
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      Your next 10 appointments already scheduled     Dec 18, 2018 10:00 AM CST   RETURN GENERAL with Tano Mcduffie MD   Eye Clinic ([facility])    [address] [id number] [phone]              Who to contact     Please call your clinic at [phone] to:    Ask questions about your health    Make or cancel appointments    Discuss your medicines    Learn about your test results    Speak to your doctor            Additional Information About Your Visit        MyChart Information     [employer] gives you secure access to your electronic health record. If you see a primary care provider, you can also send messages to your care team and make appointments. If you have questions, please call your primary care clinic.  If you do not have a primary care provider, please call [phone] and they will assist you.      [employer] is an electronic gateway that provides easy, online access to your medical records. With [employer], you can request a clinic appointment, read your test results, renew a prescription or communicate with your care team.     To access your existing account, please contact your [facility] or call [phone] for assistance.         Care EveryWhere ID     This is your Care EveryWhere ID. This could be used by other organizations to access your [place] medical records  [id number]         Blood Pressure from Last 3 Encounters:   No data found for BP    Weight from Last 3 Encounters:   No data found for Wt              We Performed the Following     Avastin (Bevacizumab) 1.25MG Intravitreal Injection OD (right eye)     OCT Retina Spectralis OU (both eyes)        Primary Care Provider Office Phone # Fax #    Cindi Delacruz [phone]       [facility] [address]  [facility] [address]        Equal Access to Services     [facility] : Hadii aad ku hadasho Soomaali, waaxda luqadaha, qaybta kaalmada adedixonyakevon, faina greer . So [facility] [phone].    ATENCIÓN: Si habla español, tiene a engle disposición servicios gratuitos de asistencia lingüística. Ll[facility] [phone].    We comply with applicable federal civil rights laws and [place] laws. We do not discriminate on the basis of race, color, national origin, age, disability, sex, sexual orientation, or gender identity.            Thank you!     Thank you for choosing EYE CLINIC  for your care. Our goal is always to provide you with excellent care. Hearing back from our patients is one way we can continue to improve our services. Please take a few minutes to complete the written survey that you may receive in the mail after your visit with us. Thank you!             Your Updated Medication List - Protect others around you: Learn how to safely use, store and throw away your medicines at www.disposemymeds.org.          This list is accurate as of 10/23/18  6:17 PM.  Always use your most recent med list.                   Brand Name Dispense Instructions for use Diagnosis    ASTAXANTHIN PO           Bilberry 100 MG Caps      Take 100 mg by mouth        Coenzyme Q10-Vitamin E 100-100 MG-UNIT Caps      Take 1 capsule by mouth         l-carnitine 250 MG Caps           MAGNESIUM PO      Take 500 mg by mouth        OCUVITE PO      Take 1 tablet by mouth daily Since May 2017        Quercetin 50 MG Tabs      800 mg        Resveratrol 50 MG Caps      200 mg        UNABLE TO FIND      Indications: L-Carnosine -- 500 mg        VITAMIN B COMPLEX PO      Take 1 capsule by mouth        vitamin D3 1000 units Caps      2,500 Units        Vitamin K2 100 MCG Caps      5 mg        Zinc Methionate 50 MG Caps      Take 50 mg by mouth

## 2018-10-23 NOTE — NURSING NOTE
Chief Complaints and History of Present Illnesses   Patient presents with     Follow Up For     2 month f/u for Avastin inj RE     HPI    Symptoms:     No floaters   No flashes   No redness   No tearing   No Dryness         Do you have eye pain now?:  No      Comments:  Pt here for a 2 month f/u for Avastin inj RE. Pt notes vision in LE is getting worse x 2 months. RE vision is about the same no changes.     MICHAEL Colon 10:19 AM October 23, 2018

## 2018-10-23 NOTE — PROGRESS NOTES
Assessment & Plan      Aminta Cortés is a 78 year old female with the following diagnoses:   1. 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 Gabriel at [place]  Continuing treat and extend, did well with 6 week interval.      #2 of 3 at 8 week intervals - avastin   RBA to repeat avastin injection today, right eye.    AREBLADIMIR and Autumn reviewed    Patient disposition:   Return in about 2 months (around 12/23/2018) for VT only, avastin RIGHT.       Attending Physician Attestation:  Complete documentation of historical and exam elements from today's encounter can be found in the full encounter summary report (not reduplicated in this progress note).  I personally obtained the chief complaint(s) and history of present illness.  I confirmed and edited as necessary the review of systems, past medical/surgical history, family history, social history, and examination findings as documented by others; and I examined the patient myself.  I personally reviewed the relevant tests, images, and reports as documented above.  I formulated and edited as necessary the assessment and plan and discussed the findings and management plan with the patient and family. . - Tano Mcduffie MD

## 2018-12-18 ENCOUNTER — OFFICE VISIT (OUTPATIENT)
Dept: OPHTHALMOLOGY | Facility: CLINIC | Age: 78
End: 2018-12-18
Attending: OPHTHALMOLOGY
Payer: MEDICARE

## 2018-12-18 DIAGNOSIS — H35.3212 EXUDATIVE AGE-RELATED MACULAR DEGENERATION OF RIGHT EYE WITH INACTIVE CHOROIDAL NEOVASCULARIZATION (H): Primary | ICD-10-CM

## 2018-12-18 PROCEDURE — C9257 BEVACIZUMAB INJECTION: HCPCS | Mod: ZF | Performed by: OPHTHALMOLOGY

## 2018-12-18 PROCEDURE — 25000128 H RX IP 250 OP 636: Mod: ZF | Performed by: OPHTHALMOLOGY

## 2018-12-18 PROCEDURE — G0463 HOSPITAL OUTPT CLINIC VISIT: HCPCS | Mod: ZF

## 2018-12-18 PROCEDURE — 67028 INJECTION EYE DRUG: CPT | Mod: ZF | Performed by: OPHTHALMOLOGY

## 2018-12-18 RX ADMIN — Medication 1.25 MG: at 10:39

## 2018-12-18 ASSESSMENT — VISUAL ACUITY
OS_SC+: +2
OD_SC: 20/20
OD_SC+: -2
OS_SC: 20/30
METHOD: SNELLEN - LINEAR
CORRECTION_TYPE: GLASSES

## 2018-12-18 ASSESSMENT — TONOMETRY
OD_IOP_MMHG: 12
OS_IOP_MMHG: 14
IOP_METHOD: TONOPEN

## 2018-12-18 ASSESSMENT — REFRACTION_WEARINGRX
OD_ADD: +3.00
OS_ADD: +3.00
OD_CYLINDER: +0.50
OS_SPHERE: -1.00
OS_CYLINDER: +0.75
OD_SPHERE: -0.25
SPECS_TYPE: PAL
OD_AXIS: 163
OS_AXIS: 0045

## 2018-12-18 ASSESSMENT — CONF VISUAL FIELD
OD_NORMAL: 1
OS_NORMAL: 1

## 2018-12-18 NOTE — PROGRESS NOTES
Chief Complaint(s) and History of Present Illness(es)     Macular Degeneration Follow Up     Laterality: right eye    Pain scale: 0/10              Comments     Macular degeneration follow up.    The patient notes she gets an occasional brief FB sensation in the right   eye.  She closes her eye and the FB sensation goes away.  The patient states that in dim light when she is sitting in bed she sees a   dark spot in both eyes that are the same.  This has been ongoing for   several years.  GRISELDA Carey 10:15 AM 12/18/2018             Review of systems for the eyes was negative other than the pertinent positives/negatives listed in the HPI.      Assessment & Plan      Aminta Niurka Cortés is a 78 year old female with the following diagnoses:   1. 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 Gabriel at [place]  Continuing treat and extend, did well with 6 week interval.      #3 of 3 at 8 week intervals - avastin   RBA to repeat avastin injection today, right eye.    MANNY and Autumn reviewed      Patient disposition:   Return in about 2 months (around 2/18/2019) for DFE, OCT Macula.  If doing well, will move on to 10 week intervals         Attending Physician Attestation:  Complete documentation of historical and exam elements from today's encounter can be found in the full encounter summary report (not reduplicated in this progress note).  I personally obtained the chief complaint(s) and history of present illness.  I confirmed and edited as necessary the review of systems, past medical/surgical history, family history, social history, and examination findings as documented by others; and I examined the patient myself.  I personally reviewed the relevant tests, images, and  reports as documented above.  I formulated and edited as necessary the assessment and plan and discussed the findings and management plan with the patient and family. . - Tano Mcduffie MD

## 2019-03-04 DIAGNOSIS — H35.3212 EXUDATIVE AGE-RELATED MACULAR DEGENERATION OF RIGHT EYE WITH INACTIVE CHOROIDAL NEOVASCULARIZATION (H): Primary | ICD-10-CM

## 2019-03-05 ENCOUNTER — OFFICE VISIT (OUTPATIENT)
Dept: OPHTHALMOLOGY | Facility: CLINIC | Age: 79
End: 2019-03-05
Attending: OPHTHALMOLOGY
Payer: COMMERCIAL

## 2019-03-05 DIAGNOSIS — H35.3212 EXUDATIVE AGE-RELATED MACULAR DEGENERATION OF RIGHT EYE WITH INACTIVE CHOROIDAL NEOVASCULARIZATION (H): ICD-10-CM

## 2019-03-05 DIAGNOSIS — H35.3112 INTERMEDIATE STAGE NONEXUDATIVE AGE-RELATED MACULAR DEGENERATION OF RIGHT EYE: Primary | ICD-10-CM

## 2019-03-05 DIAGNOSIS — Z96.1 PSEUDOPHAKIA OF BOTH EYES: ICD-10-CM

## 2019-03-05 PROCEDURE — C9257 BEVACIZUMAB INJECTION: HCPCS | Mod: ZF | Performed by: OPHTHALMOLOGY

## 2019-03-05 PROCEDURE — 67028 INJECTION EYE DRUG: CPT | Mod: RT,ZF | Performed by: OPHTHALMOLOGY

## 2019-03-05 PROCEDURE — 25000128 H RX IP 250 OP 636: Mod: ZF | Performed by: OPHTHALMOLOGY

## 2019-03-05 PROCEDURE — G0463 HOSPITAL OUTPT CLINIC VISIT: HCPCS | Mod: ZF,25

## 2019-03-05 PROCEDURE — 92134 CPTRZ OPH DX IMG PST SGM RTA: CPT | Mod: ZF | Performed by: OPHTHALMOLOGY

## 2019-03-05 RX ADMIN — Medication 1.25 MG: at 12:03

## 2019-03-05 ASSESSMENT — CONF VISUAL FIELD
OD_NORMAL: 1
METHOD: COUNTING FINGERS
OS_NORMAL: 1

## 2019-03-05 ASSESSMENT — REFRACTION_WEARINGRX
OS_SPHERE: -1.00
OD_CYLINDER: +0.50
OS_AXIS: 0045
OD_ADD: +3.00
SPECS_TYPE: PAL
OS_CYLINDER: +0.75
OD_AXIS: 163
OS_ADD: +3.00
OD_SPHERE: -0.25

## 2019-03-05 ASSESSMENT — VISUAL ACUITY
CORRECTION_TYPE: GLASSES
OS_CC+: +1
OD_CC: 20/25
METHOD: SNELLEN - LINEAR
OS_CC: 20/40
OD_CC+: -3

## 2019-03-05 ASSESSMENT — SLIT LAMP EXAM - LIDS
COMMENTS: NORMAL
COMMENTS: NORMAL

## 2019-03-05 ASSESSMENT — EXTERNAL EXAM - LEFT EYE: OS_EXAM: NORMAL

## 2019-03-05 ASSESSMENT — TONOMETRY
OS_IOP_MMHG: 10
OD_IOP_MMHG: 13
IOP_METHOD: TONOPEN

## 2019-03-05 ASSESSMENT — CUP TO DISC RATIO
OD_RATIO: 0.2
OS_RATIO: 0.2

## 2019-03-05 ASSESSMENT — EXTERNAL EXAM - RIGHT EYE: OD_EXAM: NORMAL

## 2019-03-05 NOTE — PROGRESS NOTES
Chief Complaint(s) and History of Present Illness(es)     Exudative Macular Degeneration Follow Up     Associated symptoms: Negative for floaters and flashes    Comments: 11 week follow up               Comments     Pt states vision is the same as last visit. No eye pain today. Pt notes   slightly more distortion in LE.    Tyra Flannery COMT March 5, 2019 10:56 AM        Review of systems for the eyes was negative other than the pertinent positives/negatives listed in the HPI.      Assessment & Plan      Aminta Cortés is a 78 year old female with the following diagnoses:   1. 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 Gabriel at [place]  Continuing treat and extend, did well with 6 week interval.      S/P #3 of 3 at 8 week intervals avastin   Last injection 12/18/18- 11 weeks ago  Today with mild increase in subretinal fluid and single new blot hemorrhage seen right eye     RBA to repeat avastin injection today, right eye.  Will plan for 10 week interval starting today #1 of 3   AREDS and Autumn reviewed      Patient disposition:   Return in about 10 weeks (around 5/14/2019) for Follow Up.        Allyson Mora MD  Ophthalmology Resident, PGY-2    Attending Physician Attestation:  Complete documentation of historical and exam elements from today's encounter can be found in the full encounter summary report (not reduplicated in this progress note).  I personally obtained the chief complaint(s) and history of present illness.  I confirmed and edited as necessary the review of systems, past medical/surgical history, family  history, social history, and examination findings as documented by others; and I examined the patient myself.  I personally reviewed the relevant tests, images, and reports as documented above.  I formulated and edited as necessary the assessment and plan and discussed the findings and management plan with the patient and family. Attending Physician Image/Tesing Attestation: I personally reviewed the ophthalmic test(s) associated with this encounter, agree with the interpretation(s) as documented by the resident/fellow, and have edited the corresponding report(s) as necessary.  Attending Physician Procedure Attestation: I was present for the entire procedure     . - Tano Mcduffie MD

## 2019-05-15 ENCOUNTER — OFFICE VISIT (OUTPATIENT)
Dept: OPHTHALMOLOGY | Facility: CLINIC | Age: 79
End: 2019-05-15
Attending: OPHTHALMOLOGY
Payer: COMMERCIAL

## 2019-05-15 DIAGNOSIS — H35.3212 EXUDATIVE AGE-RELATED MACULAR DEGENERATION OF RIGHT EYE WITH INACTIVE CHOROIDAL NEOVASCULARIZATION (H): Primary | ICD-10-CM

## 2019-05-15 PROCEDURE — 67028 INJECTION EYE DRUG: CPT | Mod: RT,ZF | Performed by: OPHTHALMOLOGY

## 2019-05-15 PROCEDURE — 25000128 H RX IP 250 OP 636: Mod: ZF | Performed by: OPHTHALMOLOGY

## 2019-05-15 PROCEDURE — G0463 HOSPITAL OUTPT CLINIC VISIT: HCPCS | Mod: ZF,25

## 2019-05-15 PROCEDURE — C9257 BEVACIZUMAB INJECTION: HCPCS | Mod: ZF | Performed by: OPHTHALMOLOGY

## 2019-05-15 RX ADMIN — Medication 1.25 MG: at 13:08

## 2019-05-15 ASSESSMENT — VISUAL ACUITY
CORRECTION_TYPE: GLASSES
METHOD: SNELLEN - LINEAR
OS_CC+: ECC
OD_CC: 20/25
OS_CC: 20/40

## 2019-05-15 ASSESSMENT — TONOMETRY
OD_IOP_MMHG: 12
IOP_METHOD: ICARE
OS_IOP_MMHG: 13

## 2019-05-15 ASSESSMENT — REFRACTION_WEARINGRX
SPECS_TYPE: PAL
OS_ADD: +3.00
OD_SPHERE: -0.25
OS_AXIS: 0045
OS_CYLINDER: +0.75
OD_AXIS: 163
OD_ADD: +3.00
OS_SPHERE: -1.00
OD_CYLINDER: +0.50

## 2019-05-15 NOTE — NURSING NOTE
Patient presents for 10wk f/u of Intermediate stage nonexudative age-related macular degeneration with possible injection 2/3. Since the last visit the vision has been declining in the untreated eye, wavy lines, hard reading close captioning. No pain, discomfort, no redness. Intermittent itching, no tears. No f/f. No eye drops. Tawnya Lowe COT 12:27 PM May 15, 2019

## 2019-05-15 NOTE — PROGRESS NOTES
Chief Complaint(s) and History of Present Illness(es)     Intermediate stage nonexudative age-related macular degeneration               Comments     Patient presents for 10wk f/u of Intermediate stage nonexudative   age-related macular degeneration with possible injection 2/3. Since the   last visit the vision has been declining in the untreated eye, wavy lines,   hard reading close captioning. No pain, discomfort, no redness.   Intermittent itching, no tears. No f/f. No eye drops. Tawnya Lowe COT   12:27 PM May 15, 2019               Review of systems for the eyes was negative other than the pertinent positives/negatives listed in the HPI.      Assessment & Plan      Aminta Cortés is a 79 year old female with the following diagnoses:   1. 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 Gabriel at [place]  Continuing treat and extend, did well with 8 week interval.      RBA to repeat avastin injection today, right eye.    Continue 10 week interval starting today #2 of 3   AREDS and Autumn reviewed      Patient disposition:   Return in about 2 months (around 7/24/2019) for VT only, Avastin RIGHT.           Attending Physician Attestation:  Complete documentation of historical and exam elements from today's encounter can be found in the full encounter summary report (not reduplicated in this progress note).  I personally obtained the chief complaint(s) and history of present illness.  I confirmed and edited as necessary the review of systems, past medical/surgical history, family history, social history, and examination findings as documented by others; and I examined the patient myself.  I personally reviewed the relevant tests, images, and reports as documented above.  I formulated and edited as necessary the assessment and plan  and discussed the findings and management plan with the patient and family. . - Tano Mcduffie MD

## 2019-07-24 ENCOUNTER — OFFICE VISIT (OUTPATIENT)
Dept: OPHTHALMOLOGY | Facility: CLINIC | Age: 79
End: 2019-07-24
Attending: OPHTHALMOLOGY
Payer: COMMERCIAL

## 2019-07-24 DIAGNOSIS — H35.3212 EXUDATIVE AGE-RELATED MACULAR DEGENERATION OF RIGHT EYE WITH INACTIVE CHOROIDAL NEOVASCULARIZATION (H): Primary | ICD-10-CM

## 2019-07-24 PROCEDURE — 92134 CPTRZ OPH DX IMG PST SGM RTA: CPT | Mod: ZF | Performed by: OPHTHALMOLOGY

## 2019-07-24 PROCEDURE — C9257 BEVACIZUMAB INJECTION: HCPCS | Mod: ZF | Performed by: OPHTHALMOLOGY

## 2019-07-24 PROCEDURE — 25000128 H RX IP 250 OP 636: Mod: ZF | Performed by: OPHTHALMOLOGY

## 2019-07-24 PROCEDURE — 67028 INJECTION EYE DRUG: CPT | Mod: RT,ZF | Performed by: OPHTHALMOLOGY

## 2019-07-24 PROCEDURE — 40000269 ZZH STATISTIC NO CHARGE FACILITY FEE: Mod: ZF

## 2019-07-24 RX ADMIN — Medication 1.25 MG: at 13:40

## 2019-07-24 ASSESSMENT — VISUAL ACUITY
CORRECTION_TYPE: GLASSES
METHOD: SNELLEN - LINEAR
OD_CC+: -1
OD_CC: 20/25 SLOW

## 2019-07-24 ASSESSMENT — CONF VISUAL FIELD
METHOD: COUNTING FINGERS
OD_NORMAL: 1
OS_INFERIOR_NASAL_RESTRICTION: 3

## 2019-07-24 ASSESSMENT — TONOMETRY
IOP_METHOD: TONOPEN
OD_IOP_MMHG: 09
OS_IOP_MMHG: 11

## 2019-07-24 NOTE — NURSING NOTE
Chief Complaint(s) and History of Present Illness(es)     Follow Up     In right eye.  Associated symptoms include Negative for dryness, redness, eye pain and tearing.              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 Pt notes it is gradually getting worse in LE over the last 2 months. RE vision is a little more difficult to see to read with glasses over the last 2 months.     Shelbi Finney, [facility] 12:41 PM July 24, 2019

## 2019-07-24 NOTE — PROGRESS NOTES
Chief Complaint(s) and History of Present Illness(es)     Follow Up     Laterality: right eye    Associated symptoms: Negative for dryness, redness, eye pain and tearing              Comments     Pt is here for a 2 month f/u for Exudative age-related macular   degeneration of right eye with inactive choroidal neovascularization and   injection RE. Pt notes it is gradually getting worse in LE over the last 2   months. RE vision is a little more difficult to see to read with glasses   over the last 2 months.     Shelbi Finney, COMT 12:41 PM July 24, 2019               Review of systems for the eyes was negative other than the pertinent positives/negatives listed in the HPI.      Assessment & Plan      Aminta Cortés is a 79 year old female with the following diagnoses:   1. 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 Gabriel at [place]  Continuing treat and extend, did well with 8 week interval.  Last 10 week injection today   Noting stable right eye vision.  Left eye seems to be worsening and missing an area in the center  OCT stable right eye, worsening central drusenoid changes notes left eye   Recommend return to Dr. Sandoval after next visit    RBA to repeat avastin injection today, right eye.    Continue 10 week interval starting today #3 of 3   AREDS and Autumn to continue         Patient disposition:   Return in about 10 weeks (around 10/2/2019) for DFE, OCT Macula, OCT-A Macula.           Attending Physician Attestation:  Complete documentation of historical and exam elements from today's encounter can be found in the full encounter summary report (not reduplicated in this progress note).  I personally obtained the chief complaint(s) and history of present illness.  I confirmed and edited as necessary the review of systems,  past medical/surgical history, family history, social history, and examination findings as documented by others; and I examined the patient myself.  I personally reviewed the relevant tests, images, and reports as documented above.  I formulated and edited as necessary the assessment and plan and discussed the findings and management plan with the patient and family. . - Tano Mcduffie MD

## 2019-10-01 DIAGNOSIS — H35.3212 EXUDATIVE AGE-RELATED MACULAR DEGENERATION OF RIGHT EYE WITH INACTIVE CHOROIDAL NEOVASCULARIZATION (H): Primary | ICD-10-CM

## 2019-10-02 ENCOUNTER — OFFICE VISIT (OUTPATIENT)
Dept: OPHTHALMOLOGY | Facility: CLINIC | Age: 79
End: 2019-10-02
Attending: OPHTHALMOLOGY
Payer: COMMERCIAL

## 2019-10-02 DIAGNOSIS — Z96.1 PSEUDOPHAKIA OF BOTH EYES: ICD-10-CM

## 2019-10-02 DIAGNOSIS — H35.3123 ADVANCED ATROPHIC NONEXUDATIVE AGE-RELATED MACULAR DEGENERATION OF LEFT EYE WITHOUT SUBFOVEAL INVOLVEMENT: ICD-10-CM

## 2019-10-02 DIAGNOSIS — H35.3212 EXUDATIVE AGE-RELATED MACULAR DEGENERATION OF RIGHT EYE WITH INACTIVE CHOROIDAL NEOVASCULARIZATION (H): Primary | ICD-10-CM

## 2019-10-02 PROCEDURE — 67028 INJECTION EYE DRUG: CPT | Mod: RT,ZF | Performed by: OPHTHALMOLOGY

## 2019-10-02 PROCEDURE — C9257 BEVACIZUMAB INJECTION: HCPCS | Mod: ZF | Performed by: OPHTHALMOLOGY

## 2019-10-02 PROCEDURE — G0463 HOSPITAL OUTPT CLINIC VISIT: HCPCS | Mod: ZF

## 2019-10-02 PROCEDURE — 92134 CPTRZ OPH DX IMG PST SGM RTA: CPT | Mod: ZF | Performed by: OPHTHALMOLOGY

## 2019-10-02 PROCEDURE — 25000128 H RX IP 250 OP 636: Mod: ZF | Performed by: OPHTHALMOLOGY

## 2019-10-02 RX ADMIN — Medication 1.25 MG: at 13:11

## 2019-10-02 ASSESSMENT — CONF VISUAL FIELD
OD_NORMAL: 1
OS_NORMAL: 1
METHOD: COUNTING FINGERS

## 2019-10-02 ASSESSMENT — SLIT LAMP EXAM - LIDS
COMMENTS: NORMAL
COMMENTS: NORMAL

## 2019-10-02 ASSESSMENT — VISUAL ACUITY
OS_CC+: -2
METHOD: SNELLEN - LINEAR
OD_CC: 20/25
CORRECTION_TYPE: GLASSES
OD_CC+: -2
OS_CC: 20/50

## 2019-10-02 ASSESSMENT — REFRACTION_WEARINGRX
OS_AXIS: 0045
OS_CYLINDER: +0.75
OD_ADD: +3.00
OS_ADD: +3.00
OS_SPHERE: -1.25
OD_AXIS: 163
OD_CYLINDER: +0.50
SPECS_TYPE: PAL
OD_SPHERE: -0.50

## 2019-10-02 ASSESSMENT — EXTERNAL EXAM - LEFT EYE: OS_EXAM: NORMAL

## 2019-10-02 ASSESSMENT — EXTERNAL EXAM - RIGHT EYE: OD_EXAM: NORMAL

## 2019-10-02 ASSESSMENT — TONOMETRY
OD_IOP_MMHG: 13
OS_IOP_MMHG: 12
IOP_METHOD: TONOPEN

## 2019-10-02 NOTE — NURSING NOTE
Chief Complaints and History of Present Illnesses   Patient presents with     Macular Degeneration Follow Up     Chief Complaint(s) and History of Present Illness(es)     Macular Degeneration Follow Up               Comments     Macular degeneration follow up.   The patient notes his vision is stable.  Maria E Hernandez COA, COA 12:29 PM 10/02/2019

## 2019-10-02 NOTE — PROGRESS NOTES
Chief Complaint(s) and History of Present Illness(es)     No visit information to display      Review of systems for the eyes was negative other than the pertinent positives/negatives listed in the HPI.      Assessment & Plan      Aminta Cortés is a 79 year old female with the following diagnoses:   1. 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 Gabriel at [place]  Continuing treat and extend, now completed 10 week series x3   OCT today with very mild subretinal fluid and intraretinal fluid    Will continue at 10 week intervals for now.  Resume new series x 3  RBA to Avastin right eye discussed, consent obtained, will proceed today. #1/3  Appreciate low vision consult  AREDS and Amsler to continue           Patient disposition:   Return in about 10 weeks (around 12/11/2019) for VT only, Avsatin RIGHT.           Attending Physician Attestation:  Complete documentation of historical and exam elements from today's encounter can be found in the full encounter summary report (not reduplicated in this progress note).  I personally obtained the chief complaint(s) and history of present illness.  I confirmed and edited as necessary the review of systems, past medical/surgical history, family history, social history, and examination findings as documented by others; and I examined the patient myself.  I personally reviewed the relevant tests, images, and reports as documented above.  I formulated and edited as necessary the assessment and plan and discussed the findings and management plan with the patient and family. . - Tano Mcduffie MD

## 2019-10-04 ENCOUNTER — HEALTH MAINTENANCE LETTER (OUTPATIENT)
Age: 79
End: 2019-10-04

## 2019-12-11 ENCOUNTER — OFFICE VISIT (OUTPATIENT)
Dept: OPHTHALMOLOGY | Facility: CLINIC | Age: 79
End: 2019-12-11
Attending: OPHTHALMOLOGY
Payer: COMMERCIAL

## 2019-12-11 DIAGNOSIS — H35.3212 EXUDATIVE AGE-RELATED MACULAR DEGENERATION OF RIGHT EYE WITH INACTIVE CHOROIDAL NEOVASCULARIZATION (H): Primary | ICD-10-CM

## 2019-12-11 PROCEDURE — C9257 BEVACIZUMAB INJECTION: HCPCS | Mod: ZF | Performed by: OPHTHALMOLOGY

## 2019-12-11 PROCEDURE — 25000128 H RX IP 250 OP 636: Mod: ZF | Performed by: OPHTHALMOLOGY

## 2019-12-11 PROCEDURE — 67028 INJECTION EYE DRUG: CPT | Mod: RT,ZF | Performed by: OPHTHALMOLOGY

## 2019-12-11 PROCEDURE — G0463 HOSPITAL OUTPT CLINIC VISIT: HCPCS | Mod: ZF

## 2019-12-11 RX ADMIN — Medication 1.25 MG: at 11:20

## 2019-12-11 ASSESSMENT — VISUAL ACUITY
OS_CC+: +2
OD_CC: 20/25
OD_CC+: +2
METHOD: SNELLEN - LINEAR
OS_CC: 20/60

## 2019-12-11 ASSESSMENT — TONOMETRY
IOP_METHOD: TONOPEN
OS_IOP_MMHG: 11
OD_IOP_MMHG: 12

## 2019-12-11 ASSESSMENT — CONF VISUAL FIELD
OS_NORMAL: 1
OD_NORMAL: 1

## 2019-12-11 NOTE — PROGRESS NOTES
Chief Complaint(s) and History of Present Illness(es)     Macular Degeneration Follow Up     Pain scale: 0/10              Comments     The patient presents for a macular degeneration follow up with injection.      The patient states there are no vision changes since last visit.  Maria E Hernandez, COA, COA 10:04 AM 12/11/2019               Review of systems for the eyes was negative other than the pertinent positives/negatives listed in the HPI.      Assessment & Plan      Aminta Cortés is a 79 year old female with the following diagnoses:   1. 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 #2/3  AREDS and Amsler to continue   Return precautions reviewed         Patient disposition:   Return in about 10 weeks (around 2/19/2020) for VT only, AVASTIN right.           Attending Physician Attestation:  Complete documentation of historical and exam elements from today's encounter can be found in the full encounter summary report (not reduplicated in this progress note).  I personally obtained the chief complaint(s) and history of present illness.  I confirmed and edited as necessary the review of systems, past medical/surgical history, family history, social history, and examination findings as documented by others; and I examined the patient myself.  I personally reviewed the relevant tests, images, and reports as documented above.  I formulated and edited as necessary the assessment and plan and discussed the findings and management plan with the patient and family. . - Tano Mcduffie MD

## 2019-12-11 NOTE — NURSING NOTE
Chief Complaints and History of Present Illnesses   Patient presents with     Macular Degeneration Follow Up     Chief Complaint(s) and History of Present Illness(es)     Macular Degeneration Follow Up     Pain scale: 0/10              Comments     The patient presents for a macular degeneration follow up with injection.    The patient states there are no vision changes since last visit.  Maria E Hernandez, COA, COA 10:04 AM 12/11/2019

## 2020-02-08 ENCOUNTER — HEALTH MAINTENANCE LETTER (OUTPATIENT)
Age: 80
End: 2020-02-08

## 2020-02-19 ENCOUNTER — OFFICE VISIT (OUTPATIENT)
Dept: OPHTHALMOLOGY | Facility: CLINIC | Age: 80
End: 2020-02-19
Attending: OPHTHALMOLOGY
Payer: COMMERCIAL

## 2020-02-19 DIAGNOSIS — Z96.1 PSEUDOPHAKIA OF BOTH EYES: ICD-10-CM

## 2020-02-19 DIAGNOSIS — H35.3123 ADVANCED ATROPHIC NONEXUDATIVE AGE-RELATED MACULAR DEGENERATION OF LEFT EYE WITHOUT SUBFOVEAL INVOLVEMENT: ICD-10-CM

## 2020-02-19 DIAGNOSIS — H35.3212 EXUDATIVE AGE-RELATED MACULAR DEGENERATION OF RIGHT EYE WITH INACTIVE CHOROIDAL NEOVASCULARIZATION (H): Primary | ICD-10-CM

## 2020-02-19 PROCEDURE — 25000128 H RX IP 250 OP 636: Mod: ZF | Performed by: OPHTHALMOLOGY

## 2020-02-19 PROCEDURE — C9257 BEVACIZUMAB INJECTION: HCPCS | Mod: ZF | Performed by: OPHTHALMOLOGY

## 2020-02-19 PROCEDURE — 67028 INJECTION EYE DRUG: CPT | Mod: RT,ZF | Performed by: OPHTHALMOLOGY

## 2020-02-19 PROCEDURE — 40000269 ZZH STATISTIC NO CHARGE FACILITY FEE: Mod: ZF

## 2020-02-19 RX ADMIN — Medication 1.25 MG: at 15:03

## 2020-02-19 ASSESSMENT — TONOMETRY
OS_IOP_MMHG: 12
OD_IOP_MMHG: 12
IOP_METHOD: TONOPEN

## 2020-02-19 ASSESSMENT — VISUAL ACUITY
OD_CC: 20/25
OS_CC: 20/40
OD_CC+: -3
OS_CC+: -1
METHOD: SNELLEN - LINEAR
CORRECTION_TYPE: GLASSES

## 2020-02-19 ASSESSMENT — CONF VISUAL FIELD
OD_NORMAL: 1
METHOD: COUNTING FINGERS
OS_NORMAL: 1

## 2020-02-19 ASSESSMENT — REFRACTION_WEARINGRX
OS_CYLINDER: +0.75
SPECS_TYPE: PAL
OS_AXIS: 0045
OD_AXIS: 163
OD_CYLINDER: +0.50
OS_ADD: +3.00
OD_SPHERE: -0.50
OS_SPHERE: -1.25
OD_ADD: +3.00

## 2020-02-19 NOTE — NURSING NOTE
Chief Complaint(s) and History of Present Illness(es)     Exudative Macular Degeneration Follow Up     In right eye.  Associated symptoms include floaters (Pt noted 2 new floaters x 2-3 days ago in RE, but went away ).  Negative for eye pain, dryness, redness and tearing.  Pain was noted as 0/10.              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 Pt notes reading with current glasses is getting harder. Pt notes she has to lift the glasses up to be able to see to read x the last few weeks.     Ocular meds: none    Shelbi Finney, COMT 2:36 PM February 19, 2020

## 2020-02-19 NOTE — PROGRESS NOTES
Chief Complaint(s) and History of Present Illness(es)     Exudative Macular Degeneration Follow Up     Laterality: right eye    Associated symptoms: floaters (Pt noted 2 new floaters x 2-3 days ago in   RE, but went away ).  Negative for eye pain, dryness, redness and tearing    Pain scale: 0/10              Comments     10 week f/u for injection only RE for Exudative age-related macular   degeneration of right eye with inactive choroidal neovascularization. Pt   notes reading with current glasses is getting harder. Pt notes she has to   lift the glasses up to be able to see to read x the last few weeks.     Ocular meds: none    Shelbi Finney, COMT 2:36 PM February 19, 2020               Review of systems for the eyes was negative other than the pertinent positives/negatives listed in the HPI.      Assessment & Plan      Aminta Cortés is a 80 year old female with the following diagnoses:   1. 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 #3/3  AREDS and Amsler to continue   Return precautions reviewed         Patient disposition:   Return in about 10 weeks (around 4/29/2020) for DFE, OCT mac, OCTA mac.           Attending Physician Attestation:  Complete documentation of historical and exam elements from today's encounter can be found in the full encounter summary report (not reduplicated in this progress note).  I personally obtained the chief complaint(s) and history of present illness.  I confirmed and edited as necessary the review of systems, past  medical/surgical history, family history, social history, and examination findings as documented by others; and I examined the patient myself.  I personally reviewed the relevant tests, images, and reports as documented above.  I formulated and edited as necessary the assessment and plan and discussed the findings and management plan with the patient and family. . - Tano Mcduffie MD

## 2020-04-14 DIAGNOSIS — H35.3212 EXUDATIVE AGE-RELATED MACULAR DEGENERATION OF RIGHT EYE WITH INACTIVE CHOROIDAL NEOVASCULARIZATION (H): Primary | ICD-10-CM

## 2020-04-23 ENCOUNTER — OFFICE VISIT (OUTPATIENT)
Dept: OPHTHALMOLOGY | Facility: CLINIC | Age: 80
End: 2020-04-23
Attending: OPHTHALMOLOGY
Payer: COMMERCIAL

## 2020-04-23 DIAGNOSIS — H35.3212 EXUDATIVE AGE-RELATED MACULAR DEGENERATION OF RIGHT EYE WITH INACTIVE CHOROIDAL NEOVASCULARIZATION (H): Primary | ICD-10-CM

## 2020-04-23 DIAGNOSIS — H35.3124 ADVANCED ATROPHIC NONEXUDATIVE AGE-RELATED MACULAR DEGENERATION OF LEFT EYE WITH SUBFOVEAL INVOLVEMENT: ICD-10-CM

## 2020-04-23 DIAGNOSIS — Z96.1 PSEUDOPHAKIA OF BOTH EYES: ICD-10-CM

## 2020-04-23 PROCEDURE — G0463 HOSPITAL OUTPT CLINIC VISIT: HCPCS | Mod: 25

## 2020-04-23 PROCEDURE — 25000128 H RX IP 250 OP 636: Mod: ZF | Performed by: OPHTHALMOLOGY

## 2020-04-23 PROCEDURE — 92134 CPTRZ OPH DX IMG PST SGM RTA: CPT | Mod: ZF | Performed by: OPHTHALMOLOGY

## 2020-04-23 PROCEDURE — 67028 INJECTION EYE DRUG: CPT | Mod: RT,ZF | Performed by: OPHTHALMOLOGY

## 2020-04-23 PROCEDURE — C9257 BEVACIZUMAB INJECTION: HCPCS | Mod: ZF | Performed by: OPHTHALMOLOGY

## 2020-04-23 RX ADMIN — Medication 1.25 MG: at 12:52

## 2020-04-23 ASSESSMENT — VISUAL ACUITY
METHOD: SNELLEN - LINEAR
OS_CC+: -1
OD_CC: 20/25
OS_CC: 20/50
CORRECTION_TYPE: GLASSES

## 2020-04-23 ASSESSMENT — TONOMETRY
OD_IOP_MMHG: 13
IOP_METHOD: TONOPEN
OS_IOP_MMHG: 13

## 2020-04-23 ASSESSMENT — REFRACTION_WEARINGRX
OD_CYLINDER: +0.50
OD_SPHERE: -0.50
SPECS_TYPE: PAL
OS_AXIS: 0045
OD_AXIS: 163
OS_ADD: +3.00
OS_SPHERE: -1.25
OD_ADD: +3.00
OS_CYLINDER: +0.75

## 2020-04-23 ASSESSMENT — CUP TO DISC RATIO
OS_RATIO: 0.2
OD_RATIO: 0.2

## 2020-04-23 ASSESSMENT — EXTERNAL EXAM - LEFT EYE: OS_EXAM: NORMAL

## 2020-04-23 ASSESSMENT — SLIT LAMP EXAM - LIDS
COMMENTS: NORMAL
COMMENTS: NORMAL

## 2020-04-23 ASSESSMENT — CONF VISUAL FIELD: OS_NORMAL: 1

## 2020-04-23 ASSESSMENT — EXTERNAL EXAM - RIGHT EYE: OD_EXAM: NORMAL

## 2020-04-23 NOTE — NURSING NOTE
Chief Complaints and History of Present Illnesses   Patient presents with     Follow Up     Chief Complaint(s) and History of Present Illness(es)     Follow Up     Laterality: right eye    Onset: gradual    Associated symptoms: floaters, itching and tearing.  Negative for flashes and pain with eye movement    Pain scale: 0/10              Comments     Exudative Macular Degeneration Follow Up w possible injection  See better when take glasses off at times.  Bernie VILLALOBOS 10:20 AM April 23, 2020

## 2020-04-23 NOTE — PROGRESS NOTES
Chief Complaint(s) and History of Present Illness(es)     Follow Up     Laterality: right eye    Onset: gradual    Associated symptoms: floaters, itching and tearing.  Negative for flashes   and pain with eye movement    Pain scale: 0/10              Comments     Exudative Macular Degeneration Follow Up w possible injection  See better when take glasses off at times.  Bernie VILLALOBOS 10:20 AM April 23, 2020          Review of systems for the eyes was negative other than the pertinent positives/negatives listed in the HPI.        History of Present Illness:   Patient reports that she feels that her vision is not good. She would like to have new prescription glasses, but understands that now it is hard to get refraction. She has occasional floaters, but no flashes of light. Her eyes feel comfortable most of the time, but does have some itching intermittently.     Chief Complaint(s) and History of Present Illness(es)     Follow Up     Laterality: right eye    Onset: gradual    Associated symptoms: floaters, itching and tearing.  Negative for flashes   and pain with eye movement    Pain scale: 0/10              Comments     Exudative Macular Degeneration Follow Up w possible injection  See better when take glasses off at times.  Bernie VILLALOBOS 10:20 AM April 23, 2020                  Review of systems for the eyes was negative other than the pertinent positives/negatives listed in the HPI.      Assessment & Plan      Aminta Cortés is a 80 year old female with the following diagnoses:   1. 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   Does feel she needs new glasses and has been struggling with reading    New intraretinal fluid right eye on OCT mac  RBA to Avastin right eye discussed, consent obtained, will proceed today. Will tighten frequency back to 8 weeks  AREDS and Amsler to continue   Return precautions reviewed   Plan for refraction once fluid resolved  Discussed option of medication change to attempt further treatment interval once fluid is resolved.  She will consider      Patient disposition:   Return in about 2 months (around 6/23/2020) for VT only, Refraction, OCT mac.             Anthony Ibarra MD  Ophthalmology PGY-2      Attending Physician Attestation:  Complete documentation of historical and exam elements from today's encounter can be found in the full encounter summary report (not reduplicated in this progress note).  I personally obtained the chief complaint(s) and history of present illness.  I confirmed and edited as necessary the review of systems, past medical/surgical history, family history, social history, and examination findings as documented by others; and I examined the patient myself.  I personally reviewed the relevant tests, images, and reports as documented above.  I formulated and edited as necessary the assessment and plan and discussed the findings and management plan with the patient and family. .Attending Physician Image/Tesing Attestation: I personally reviewed the ophthalmic test(s) associated with this encounter, agree with the interpretation(s) as documented by the resident/fellow, and have edited the corresponding report(s) as necessary.  Attending Physician Procedure Attestation: I was present for the entire procedure      - Tano Mcduffie MD

## 2020-06-25 ENCOUNTER — OFFICE VISIT (OUTPATIENT)
Dept: OPHTHALMOLOGY | Facility: CLINIC | Age: 80
End: 2020-06-25
Attending: OPHTHALMOLOGY
Payer: COMMERCIAL

## 2020-06-25 DIAGNOSIS — H35.3124 ADVANCED ATROPHIC NONEXUDATIVE AGE-RELATED MACULAR DEGENERATION OF LEFT EYE WITH SUBFOVEAL INVOLVEMENT: ICD-10-CM

## 2020-06-25 DIAGNOSIS — H04.123 BILATERAL DRY EYES: ICD-10-CM

## 2020-06-25 DIAGNOSIS — Z96.1 PSEUDOPHAKIA OF BOTH EYES: ICD-10-CM

## 2020-06-25 DIAGNOSIS — H35.3211 EXUDATIVE AGE-RELATED MACULAR DEGENERATION OF RIGHT EYE WITH ACTIVE CHOROIDAL NEOVASCULARIZATION (H): Primary | ICD-10-CM

## 2020-06-25 PROCEDURE — 92015 DETERMINE REFRACTIVE STATE: CPT | Mod: ZF

## 2020-06-25 PROCEDURE — 25000128 H RX IP 250 OP 636: Mod: ZF | Performed by: OPHTHALMOLOGY

## 2020-06-25 PROCEDURE — G0463 HOSPITAL OUTPT CLINIC VISIT: HCPCS | Mod: ZF

## 2020-06-25 PROCEDURE — 92134 CPTRZ OPH DX IMG PST SGM RTA: CPT | Mod: ZF | Performed by: OPHTHALMOLOGY

## 2020-06-25 PROCEDURE — 67028 INJECTION EYE DRUG: CPT | Mod: RT,ZF | Performed by: OPHTHALMOLOGY

## 2020-06-25 RX ADMIN — AFLIBERCEPT 2 MG: 40 INJECTION, SOLUTION INTRAVITREAL at 13:08

## 2020-06-25 ASSESSMENT — VISUAL ACUITY
OS_CC+: -1+2
OS_PH_CC+: -2
OS_CC: 20/60
METHOD: SNELLEN - LINEAR
OS_PH_CC: 20/30
OD_CC+: -1+2
OD_CC: 20/30
CORRECTION_TYPE: GLASSES

## 2020-06-25 ASSESSMENT — REFRACTION_MANIFEST
OS_CYLINDER: +0.75
OD_SPHERE: -0.75
OD_CYLINDER: +0.50
OS_ADD: +3.00
OS_SPHERE: -0.25
OS_AXIS: 060
OD_AXIS: 133
OD_ADD: +3.00

## 2020-06-25 ASSESSMENT — REFRACTION_WEARINGRX
OD_ADD: +3.00
OS_ADD: +3.00
OD_SPHERE: -0.50
OD_AXIS: 163
OS_CYLINDER: +0.75
OD_CYLINDER: +0.50
OS_AXIS: 0045
SPECS_TYPE: PAL
OS_SPHERE: -1.25

## 2020-06-25 ASSESSMENT — TONOMETRY
OS_IOP_MMHG: 13
OD_IOP_MMHG: 12
IOP_METHOD: TONOPEN

## 2020-06-25 ASSESSMENT — CONF VISUAL FIELD
OS_NORMAL: 1
OD_NORMAL: 1
METHOD: COUNTING FINGERS

## 2020-06-25 NOTE — PROGRESS NOTES
Chief Complaint(s) and History of Present Illness(es)     Macular Degeneration Follow Up     Laterality: both eyes    Associated symptoms: itching.  Negative for dryness, eye pain, tearing,   flashes, floaters and discharge    Treatments tried: no treatments    Pain scale: 0/10    Comments: 2 month follow up AMD, both eyes              Comments     Pt denies any significant vision changes in either eye since last visit.  Complains of BE itching occasional from seasonal allergies - would like   Dr's recommendation on best allergy eye drops.  Denies any flashes, floaters, pain, pressure, discharge, and tearing.  Ocular Meds: none    Julisaanthony Joyce OT 9:33 AM June 25, 2020             Review of systems for the eyes was negative other than the pertinent positives/negatives listed in the HPI.      Assessment & Plan      Amintaray Cortés is a 80 year old female with the following diagnoses:   1. Exudative age-related macular degeneration of right eye with active choroidal neovascularization (H)    2. 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   Start Q8 week series, #1/3 today  MANNY and Autumn to continue   Refractive options reviewed  Refraction given     Return precautions reviewed         Patient disposition:   Return in about 2 months (around 8/25/2020) for VT only, Eylea injection RIGHT.           Attending Physician Attestation:  Complete documentation of historical and exam elements from today's encounter can be found in the full encounter summary report (not reduplicated in this progress note).  I personally obtained the chief  complaint(s) and history of present illness.  I confirmed and edited as necessary the review of systems, past medical/surgical history, family history, social history, and examination findings as documented by others; and I examined the patient myself.  I personally reviewed the relevant tests, images, and reports as documented above.  I formulated and edited as necessary the assessment and plan and discussed the findings and management plan with the patient and family. . - Tano Mcduffie MD

## 2020-06-25 NOTE — NURSING NOTE
Chief Complaints and History of Present Illnesses   Patient presents with     Macular Degeneration Follow Up     2 month follow up AMD, both eyes     Chief Complaint(s) and History of Present Illness(es)     Macular Degeneration Follow Up     Laterality: both eyes    Associated symptoms: itching.  Negative for dryness, eye pain, tearing, flashes, floaters and discharge    Treatments tried: no treatments    Pain scale: 0/10    Comments: 2 month follow up AMD, both eyes              Comments     Pt denies any significant vision changes in either eye since last visit.  Complains of BE itching occasional from seasonal allergies - would like Dr's recommendation on best allergy eye drops.  Denies any flashes, floaters, pain, pressure, discharge, and tearing.  Ocular Meds: none    Julisa Joyce OT 9:33 AM June 25, 2020

## 2020-08-20 ENCOUNTER — OFFICE VISIT (OUTPATIENT)
Dept: OPHTHALMOLOGY | Facility: CLINIC | Age: 80
End: 2020-08-20
Attending: OPHTHALMOLOGY
Payer: COMMERCIAL

## 2020-08-20 DIAGNOSIS — H35.3211 EXUDATIVE AGE-RELATED MACULAR DEGENERATION OF RIGHT EYE WITH ACTIVE CHOROIDAL NEOVASCULARIZATION (H): Primary | ICD-10-CM

## 2020-08-20 PROCEDURE — 25000128 H RX IP 250 OP 636: Mod: ZF | Performed by: OPHTHALMOLOGY

## 2020-08-20 PROCEDURE — G0463 HOSPITAL OUTPT CLINIC VISIT: HCPCS | Mod: ZF

## 2020-08-20 PROCEDURE — 40000269 ZZH STATISTIC NO CHARGE FACILITY FEE: Mod: ZF

## 2020-08-20 PROCEDURE — 67028 INJECTION EYE DRUG: CPT | Mod: RT,ZF | Performed by: OPHTHALMOLOGY

## 2020-08-20 RX ADMIN — AFLIBERCEPT 2 MG: 40 INJECTION, SOLUTION INTRAVITREAL at 10:38

## 2020-08-20 ASSESSMENT — VISUAL ACUITY
OD_CC: 20/30
METHOD: SNELLEN - LINEAR
OS_CC: 20/60
CORRECTION_TYPE: GLASSES

## 2020-08-20 ASSESSMENT — REFRACTION_WEARINGRX
OS_CYLINDER: +0.75
OD_SPHERE: -0.50
OD_CYLINDER: +0.50
OS_AXIS: 0045
OS_SPHERE: -1.25
OS_ADD: +3.00
OD_AXIS: 163
SPECS_TYPE: PAL
OD_ADD: +3.00

## 2020-08-20 ASSESSMENT — TONOMETRY
OS_IOP_MMHG: 10
OD_IOP_MMHG: 10
IOP_METHOD: ICARE

## 2020-08-20 NOTE — PROGRESS NOTES
Chief Complaint(s) and History of Present Illness(es)     Macular Degeneration Follow Up     Laterality: both eyes    Onset: 8 weeks ago              Comments     Pt. States that she is doing well.  No change in VA BE. No pain BE.  Joselyn Centeno COT 9:55 AM August 20, 2020               Review of systems for the eyes was negative other than the pertinent positives/negatives listed in the HPI.      Assessment & Plan      Aminta Cortés is a 80 year old female with the following diagnoses:   1. 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   Cont Q8 week series, #2/3 today  AREDS and Amsler to continue   Return precautions reviewed     Patient disposition:   Return in about 2 months (around 10/20/2020) for VT only; Eylea right.           Attending Physician Attestation:  Complete documentation of historical and exam elements from today's encounter can be found in the full encounter summary report (not reduplicated in this progress note).  I personally obtained the chief complaint(s) and history of present illness.  I confirmed and edited as necessary the review of systems, past medical/surgical history, family history, social history, and examination findings as documented by others; and I examined the patient myself.  I personally reviewed the relevant tests, images, and reports as documented above.  I formulated and edited as necessary the assessment and plan and discussed the findings and management plan with the patient and family. . - Tano Mcduffie MD

## 2020-08-20 NOTE — NURSING NOTE
Chief Complaints and History of Present Illnesses   Patient presents with     Macular Degeneration Follow Up     Chief Complaint(s) and History of Present Illness(es)     Macular Degeneration Follow Up     Laterality: both eyes    Onset: 8 weeks ago              Comments     Pt. States that she is doing well.  No change in VA BE. No pain BE.  Joselyn Centeno COT 9:55 AM August 20, 2020

## 2020-10-15 ENCOUNTER — OFFICE VISIT (OUTPATIENT)
Dept: OPHTHALMOLOGY | Facility: CLINIC | Age: 80
End: 2020-10-15
Attending: OPHTHALMOLOGY
Payer: COMMERCIAL

## 2020-10-15 DIAGNOSIS — H35.3211 EXUDATIVE AGE-RELATED MACULAR DEGENERATION OF RIGHT EYE WITH ACTIVE CHOROIDAL NEOVASCULARIZATION (H): Primary | ICD-10-CM

## 2020-10-15 PROBLEM — E04.1 THYROID NODULE: Status: ACTIVE | Noted: 2020-10-15

## 2020-10-15 PROBLEM — L57.0 ACTINIC KERATOSIS: Status: ACTIVE | Noted: 2020-10-15

## 2020-10-15 PROBLEM — H35.30 MACULAR DEGENERATION: Status: ACTIVE | Noted: 2017-01-01

## 2020-10-15 PROBLEM — G25.2 ACTION TREMOR: Status: ACTIVE | Noted: 2019-05-27

## 2020-10-15 PROBLEM — Z78.0 MENOPAUSE PRESENT: Status: ACTIVE | Noted: 2020-10-15

## 2020-10-15 PROBLEM — R13.10 DYSPHAGIA: Status: ACTIVE | Noted: 2019-05-27

## 2020-10-15 PROBLEM — H35.3290 EXUDATIVE AGE-RELATED MACULAR DEGENERATION (H): Status: ACTIVE | Noted: 2017-07-07

## 2020-10-15 PROCEDURE — 250N000011 HC RX IP 250 OP 636: Performed by: OPHTHALMOLOGY

## 2020-10-15 PROCEDURE — G0463 HOSPITAL OUTPT CLINIC VISIT: HCPCS

## 2020-10-15 PROCEDURE — 67028 INJECTION EYE DRUG: CPT | Mod: RT | Performed by: OPHTHALMOLOGY

## 2020-10-15 RX ADMIN — AFLIBERCEPT 2 MG: 40 INJECTION, SOLUTION INTRAVITREAL at 10:16

## 2020-10-15 ASSESSMENT — CONF VISUAL FIELD
OS_NORMAL: 1
OD_NORMAL: 1

## 2020-10-15 ASSESSMENT — VISUAL ACUITY
METHOD: SNELLEN - LINEAR
OD_CC: 20/30
OS_PH_CC: 20/50
CORRECTION_TYPE: GLASSES
OS_CC: 20/60
OS_CC+: -2
OD_CC+: -2

## 2020-10-15 ASSESSMENT — TONOMETRY
OD_IOP_MMHG: 9
IOP_METHOD: ICARE
OS_IOP_MMHG: 8

## 2020-10-15 NOTE — PROGRESS NOTES
Chief Complaint(s) and History of Present Illness(es)     Macular Degeneration Follow Up     Laterality: right eye    Quality: blurred vision    Frequency: constantly    Timing: throughout the day    Course: stable    Associated symptoms: Negative for eye pain, burning, floaters, flashes   and redness    Pain scale: 0/10              Comments     AMD follow up / scheduled for injection today.  Pt states no changes in VA / no complaints of ocular discomfort.  Lisa Gonzalez, COT COT 9:32 AM 10/15/2020                    Review of systems for the eyes was negative other than the pertinent positives/negatives listed in the HPI.      Assessment & Plan      Aminta Niurka Cortés is a 80 year old female with the following diagnoses:   1. 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   Cont Q8 week series, #3/3 today  AREDS and Amsler to continue   Return precautions reviewed         Patient disposition:   Return in about 2 months (around 12/15/2020) for DFE, OCT Macula, FA transit right ([facility]).           Attending Physician Attestation:  Complete documentation of historical and exam elements from today's encounter can be found in the full encounter summary report (not reduplicated in this progress note).  I personally obtained the chief complaint(s) and history of present illness.  I confirmed and edited as necessary the review of systems, past medical/surgical history, family history, social history, and examination findings as documented by others; and I examined the patient myself.  I personally reviewed the relevant tests, images, and reports as documented above.  I formulated and edited as necessary the assessment and plan and discussed the findings and management plan  with the patient and family. . - Tano Mcduffie MD

## 2020-10-15 NOTE — NURSING NOTE
Chief Complaints and History of Present Illnesses   Patient presents with     Macular Degeneration Follow Up     Chief Complaint(s) and History of Present Illness(es)     Macular Degeneration Follow Up     Laterality: right eye    Quality: blurred vision    Frequency: constantly    Timing: throughout the day    Course: stable    Associated symptoms: Negative for eye pain, burning, floaters, flashes and redness    Pain scale: 0/10              Comments     AMD follow up / scheduled for injection today.  Pt states no changes in VA / no complaints of ocular discomfort.  RAFY Jones COT 9:32 AM 10/15/2020

## 2020-12-03 ENCOUNTER — OFFICE VISIT (OUTPATIENT)
Dept: OPHTHALMOLOGY | Facility: CLINIC | Age: 80
End: 2020-12-03
Attending: OPHTHALMOLOGY
Payer: COMMERCIAL

## 2020-12-03 DIAGNOSIS — H35.3211 EXUDATIVE AGE-RELATED MACULAR DEGENERATION OF RIGHT EYE WITH ACTIVE CHOROIDAL NEOVASCULARIZATION (H): Primary | ICD-10-CM

## 2020-12-03 PROCEDURE — 99214 OFFICE O/P EST MOD 30 MIN: CPT | Performed by: OPHTHALMOLOGY

## 2020-12-03 PROCEDURE — 92134 CPTRZ OPH DX IMG PST SGM RTA: CPT | Performed by: OPHTHALMOLOGY

## 2020-12-03 PROCEDURE — 92235 FLUORESCEIN ANGRPH MLTIFRAME: CPT | Performed by: OPHTHALMOLOGY

## 2020-12-03 PROCEDURE — G0463 HOSPITAL OUTPT CLINIC VISIT: HCPCS | Mod: 25

## 2020-12-03 ASSESSMENT — CONF VISUAL FIELD
METHOD: COUNTING FINGERS
OD_NORMAL: 1
OS_NORMAL: 1

## 2020-12-03 ASSESSMENT — REFRACTION_WEARINGRX
OS_SPHERE: -1.25
OD_CYLINDER: +0.50
OS_AXIS: 0045
SPECS_TYPE: PAL
OD_SPHERE: -0.50
OD_AXIS: 163
OS_ADD: +3.00
OS_CYLINDER: +0.75
OD_ADD: +3.00

## 2020-12-03 ASSESSMENT — TONOMETRY
IOP_METHOD: TONOPEN
OD_IOP_MMHG: 14
OS_IOP_MMHG: 14

## 2020-12-03 ASSESSMENT — VISUAL ACUITY
OD_CC+: -2
OS_PH_CC: 20/40
OS_CC+: -1
OS_CC: 20/70
METHOD: SNELLEN - LINEAR
OD_CC: 20/25
OS_PH_CC+: -2

## 2020-12-03 ASSESSMENT — EXTERNAL EXAM - RIGHT EYE: OD_EXAM: NORMAL

## 2020-12-03 ASSESSMENT — SLIT LAMP EXAM - LIDS
COMMENTS: NORMAL
COMMENTS: NORMAL

## 2020-12-03 ASSESSMENT — CUP TO DISC RATIO
OD_RATIO: 0.2
OS_RATIO: 0.2

## 2020-12-03 ASSESSMENT — EXTERNAL EXAM - LEFT EYE: OS_EXAM: NORMAL

## 2020-12-03 NOTE — NURSING NOTE
Chief Complaints and History of Present Illnesses   Patient presents with     Macular Degeneration Follow Up     7 week follow up AMD, both eyes     Chief Complaint(s) and History of Present Illness(es)     Macular Degeneration Follow Up     Laterality: both eyes    Course: stable    Associated symptoms: Negative for eye pain, floaters, flashes, dryness, tearing and discharge    Pain scale: 0/10    Comments: 7 week follow up AMD, both eyes              Comments     Pt denies any significant vision changes in either eye since last visit.  Denies any flashes, floaters, pain, pressure, irritation, discharge, and tearing.  Ocular Meds: None    Julisa Maria Del Carmen OT 10:23 AM December 3, 2020

## 2020-12-03 NOTE — PROGRESS NOTES
Chief Complaint(s) and History of Present Illness(es)     Macular Degeneration Follow Up     Laterality: both eyes    Course: stable    Associated symptoms: Negative for eye pain, floaters, flashes, dryness,   tearing and discharge    Pain scale: 0/10    Comments: 7 week follow up AMD, both eyes              Comments     Pt denies any significant vision changes in either eye since last visit.  Denies any flashes, floaters, pain, pressure, irritation, discharge, and   tearing.  Ocular Meds: None    Julisa Maria Del Carmen OT 10:23 AM December 3, 2020               Review of systems for the eyes was negative other than the pertinent positives/negatives listed in the HPI.      Assessment & Plan      Aminta Cortés is a 80 year old female with the following diagnoses:   1. Exudative age-related macular degeneration of right eye with active choroidal neovascularization (H)         Stable vision per patient  NVAMD OD, Dry Age related macular degeneration OS  Previously failed 10 week extended treatment  Last injection right eye 10/15/2020    Fluorescein angiography and OCT look improved today  Possible, very mild punctate leakage inferior to fovea right eye.  No fluid    Will try to extend again.  Recheck in 3 weeks with OCT and plan for injection right eye   AREDS and Amsler to continue   Return precautions reviewed     Patient disposition:   Return in about 3 weeks (around 12/24/2020) for VT only, OCT Macula.           Attending Physician Attestation:  Complete documentation of historical and exam elements from today's encounter can be found in the full encounter summary report (not reduplicated in this progress note).  I personally obtained the chief complaint(s) and history of present illness.  I confirmed and edited as necessary the review of systems, past medical/surgical history, family history, social history, and examination findings as documented by others; and I examined the patient myself.  I personally reviewed  the relevant tests, images, and reports as documented above.  I formulated and edited as necessary the assessment and plan and discussed the findings and management plan with the patient and family. . - Tano Mcduffie MD

## 2020-12-16 DIAGNOSIS — H35.3211 EXUDATIVE AGE-RELATED MACULAR DEGENERATION OF RIGHT EYE WITH ACTIVE CHOROIDAL NEOVASCULARIZATION (H): Primary | ICD-10-CM

## 2020-12-23 ENCOUNTER — OFFICE VISIT (OUTPATIENT)
Dept: OPHTHALMOLOGY | Facility: CLINIC | Age: 80
End: 2020-12-23
Attending: OPHTHALMOLOGY
Payer: COMMERCIAL

## 2020-12-23 DIAGNOSIS — H35.3211 EXUDATIVE AGE-RELATED MACULAR DEGENERATION OF RIGHT EYE WITH ACTIVE CHOROIDAL NEOVASCULARIZATION (H): ICD-10-CM

## 2020-12-23 PROCEDURE — 250N000011 HC RX IP 250 OP 636: Performed by: OPHTHALMOLOGY

## 2020-12-23 PROCEDURE — G0463 HOSPITAL OUTPT CLINIC VISIT: HCPCS | Mod: 25

## 2020-12-23 PROCEDURE — 92134 CPTRZ OPH DX IMG PST SGM RTA: CPT | Performed by: OPHTHALMOLOGY

## 2020-12-23 PROCEDURE — 67028 INJECTION EYE DRUG: CPT | Mod: RT | Performed by: OPHTHALMOLOGY

## 2020-12-23 RX ADMIN — AFLIBERCEPT 2 MG: 40 INJECTION, SOLUTION INTRAVITREAL at 13:36

## 2020-12-23 ASSESSMENT — REFRACTION_WEARINGRX
OD_AXIS: 163
OD_CYLINDER: +0.50
OS_AXIS: 0045
OS_ADD: +3.00
SPECS_TYPE: PAL
OS_CYLINDER: +0.75
OD_SPHERE: -0.50
OD_ADD: +3.00
OS_SPHERE: -1.25

## 2020-12-23 ASSESSMENT — EXTERNAL EXAM - LEFT EYE: OS_EXAM: NORMAL

## 2020-12-23 ASSESSMENT — EXTERNAL EXAM - RIGHT EYE: OD_EXAM: NORMAL

## 2020-12-23 ASSESSMENT — CONF VISUAL FIELD
OD_NORMAL: 1
METHOD: COUNTING FINGERS
OS_NORMAL: 1

## 2020-12-23 ASSESSMENT — VISUAL ACUITY
OS_CC: 20/70
OD_CC+: -3
CORRECTION_TYPE: GLASSES
OD_CC: 20/25 SLOW
METHOD: SNELLEN - LINEAR

## 2020-12-23 ASSESSMENT — SLIT LAMP EXAM - LIDS
COMMENTS: NORMAL
COMMENTS: NORMAL

## 2020-12-23 ASSESSMENT — TONOMETRY
OD_IOP_MMHG: 11
OS_IOP_MMHG: 12
IOP_METHOD: TONOPEN

## 2020-12-23 NOTE — NURSING NOTE
Chief Complaint(s) and History of Present Illness(es)     Macular Degeneration Follow Up     In both eyes.  Associated symptoms include Negative for dryness, eye pain, redness and tearing.  Pain was noted as 0/10.              Comments     3 week f/u for macular degeneration BE. Pt denies any changes in vision x the last 3 weeks.     Ocular meds: None    MICHAEL Colon 12:56 PM December 23, 2020

## 2020-12-23 NOTE — PROGRESS NOTES
Chief Complaint(s) and History of Present Illness(es)     Macular Degeneration Follow Up     Laterality: both eyes    Associated symptoms: Negative for dryness, eye pain, redness and tearing    Pain scale: 0/10              Comments     3 week f/u for macular degeneration BE. Pt denies any changes in vision x   the last 3 weeks.     Ocular meds: None    MICHAEL Colon 12:56 PM December 23, 2020               Review of systems for the eyes was negative other than the pertinent positives/negatives listed in the HPI.      Assessment & Plan      Aminta Cortés is a 80 year old female with the following diagnoses:   1. Exudative age-related macular degeneration of right eye with active choroidal neovascularization (H)         NVAMD OD, Dry Age related macular degeneration OS  Previously failed 10 week extended treatment  Last injection right eye 10/15/2020     OCT shows small area of fluid, minimal change from 8 weeks     Plan for injection right eye with eylea   Proceed with Q10 week series and close monitoring   AREDS and Amsler to continue   Return precautions reviewed       Patient disposition:   Return in about 10 weeks (around 3/3/2021) for VT only, OCT Macula, OCT-A; Eylea RIGHT.    Lisa Phelps MD  Ophthalmology Resident, PGY-3       Attending Physician Attestation:  Complete documentation of historical and exam elements from today's encounter can be found in the full encounter summary report (not reduplicated in this progress note).  I personally obtained the chief complaint(s) and history of present illness.  I confirmed and edited as necessary the review of systems, past medical/surgical history, family history, social history, and examination findings as documented by others; and I examined the patient myself.  I personally reviewed the relevant tests, images, and reports as documented above.  I formulated and edited as necessary the assessment and plan and discussed the findings and management plan with  the patient and family.Attending Physician Image/Tesing Attestation: I personally reviewed the ophthalmic test(s) associated with this encounter, agree with the interpretation(s) as documented by the resident/fellow, and have edited the corresponding report(s) as necessary.  Attending Physician Procedure Attestation: I was present for the entire procedure      . - Tano Mcduffie MD

## 2021-03-02 ENCOUNTER — OFFICE VISIT (OUTPATIENT)
Dept: OPHTHALMOLOGY | Facility: CLINIC | Age: 81
End: 2021-03-02
Attending: OPHTHALMOLOGY
Payer: COMMERCIAL

## 2021-03-02 DIAGNOSIS — H35.3211 EXUDATIVE AGE-RELATED MACULAR DEGENERATION OF RIGHT EYE WITH ACTIVE CHOROIDAL NEOVASCULARIZATION (H): ICD-10-CM

## 2021-03-02 PROCEDURE — 92134 CPTRZ OPH DX IMG PST SGM RTA: CPT | Performed by: OPHTHALMOLOGY

## 2021-03-02 PROCEDURE — 250N000011 HC RX IP 250 OP 636: Performed by: OPHTHALMOLOGY

## 2021-03-02 PROCEDURE — 67028 INJECTION EYE DRUG: CPT | Mod: RT | Performed by: OPHTHALMOLOGY

## 2021-03-02 PROCEDURE — G0463 HOSPITAL OUTPT CLINIC VISIT: HCPCS

## 2021-03-02 RX ADMIN — AFLIBERCEPT 2 MG: 40 INJECTION, SOLUTION INTRAVITREAL at 12:24

## 2021-03-02 ASSESSMENT — VISUAL ACUITY
OD_CC: 20/25
METHOD_MR: DEFERRED BY PT
METHOD: SNELLEN - LINEAR
CORRECTION_TYPE: GLASSES
OD_CC+: -2
OS_CC: 20/50SLOW

## 2021-03-02 ASSESSMENT — SLIT LAMP EXAM - LIDS
COMMENTS: NORMAL
COMMENTS: NORMAL

## 2021-03-02 ASSESSMENT — EXTERNAL EXAM - LEFT EYE: OS_EXAM: NORMAL

## 2021-03-02 ASSESSMENT — REFRACTION_WEARINGRX
OD_CYLINDER: +0.50
OS_SPHERE: -1.25
SPECS_TYPE: PAL
OD_SPHERE: -0.50
OS_AXIS: 0045
OS_ADD: +3.00
OD_AXIS: 163
OD_ADD: +3.00
OS_CYLINDER: +0.75

## 2021-03-02 ASSESSMENT — TONOMETRY
OD_IOP_MMHG: 11
OS_IOP_MMHG: 11
IOP_METHOD: ICARE

## 2021-03-02 ASSESSMENT — EXTERNAL EXAM - RIGHT EYE: OD_EXAM: NORMAL

## 2021-03-02 NOTE — NURSING NOTE
"Chief Complaints and History of Present Illnesses   Patient presents with     Macular Degeneration Follow Up     Chief Complaint(s) and History of Present Illness(es)     Macular Degeneration Follow Up     Laterality: right eye    Onset: 10 weeks ago    Course: stable    Pain scale: 0/10              Comments     10wk NVAMD OD + Eylea injection   Vision mostly stable since LV. Doesn't check w/Amsler but w/the TV and \"it seems the same.\"   Ocular meds: Life Extension (generic/sub to AREDS)    Celestina Mora COT 10:01 AM March 2, 2021                   "

## 2021-03-02 NOTE — PROGRESS NOTES
"Chief Complaint(s) and History of Present Illness(es)     Macular Degeneration Follow Up     Laterality: right eye    Onset: 10 weeks ago    Course: stable    Pain scale: 0/10              Comments     10wk NVAMD OD + Eylea injection   Vision mostly stable since LV. Doesn't check w/Amsler but w/the TV and \"it   seems the same.\"   Ocular meds: Life Extension (generic/sub to AREDS)    Celestina Mora COT 10:01 AM March 2, 2021               Review of systems for the eyes was negative other than the pertinent positives/negatives listed in the HPI.      Assessment & Plan      Aminta Cortés is a 81 year old female with the following diagnoses:   1. Exudative age-related macular degeneration of right eye with active choroidal neovascularization (H)         NVAMD OD, Dry Age related macular degeneration OS  Previously failed 10 week extended treatment; now attempting again  Last injection right eye 12/23/2020     Stable to improved OCT right eye    Plan for injection right eye with eylea again today  Continue with Q10 week series and close monitoring (#2/3 today)  AREDS and Amsler to continue   Return precautions reviewed         Patient disposition:   Return in about 10 weeks (around 5/11/2021) for VT only, OCT Macula.           Attending Physician Attestation:  Complete documentation of historical and exam elements from today's encounter can be found in the full encounter summary report (not reduplicated in this progress note).  I personally obtained the chief complaint(s) and history of present illness.  I confirmed and edited as necessary the review of systems, past medical/surgical history, family history, social history, and examination findings as documented by others; and I examined the patient myself.  I personally reviewed the relevant tests, images, and reports as documented above.  I formulated and edited as necessary the assessment and plan and discussed the findings and management plan with the patient and " family. . - Tano Mcduffie MD

## 2021-03-27 ENCOUNTER — HEALTH MAINTENANCE LETTER (OUTPATIENT)
Age: 81
End: 2021-03-27

## 2021-05-10 DIAGNOSIS — H35.3211 EXUDATIVE AGE-RELATED MACULAR DEGENERATION OF RIGHT EYE WITH ACTIVE CHOROIDAL NEOVASCULARIZATION (H): Primary | ICD-10-CM

## 2021-05-12 ENCOUNTER — OFFICE VISIT (OUTPATIENT)
Dept: OPHTHALMOLOGY | Facility: CLINIC | Age: 81
End: 2021-05-12
Attending: OPHTHALMOLOGY
Payer: COMMERCIAL

## 2021-05-12 DIAGNOSIS — Z96.1 PSEUDOPHAKIA OF BOTH EYES: ICD-10-CM

## 2021-05-12 DIAGNOSIS — H35.3124 ADVANCED ATROPHIC NONEXUDATIVE AGE-RELATED MACULAR DEGENERATION OF LEFT EYE WITH SUBFOVEAL INVOLVEMENT: Primary | ICD-10-CM

## 2021-05-12 DIAGNOSIS — H04.123 BILATERAL DRY EYES: ICD-10-CM

## 2021-05-12 DIAGNOSIS — H35.3211 EXUDATIVE AGE-RELATED MACULAR DEGENERATION OF RIGHT EYE WITH ACTIVE CHOROIDAL NEOVASCULARIZATION (H): ICD-10-CM

## 2021-05-12 PROCEDURE — 92134 CPTRZ OPH DX IMG PST SGM RTA: CPT | Performed by: OPHTHALMOLOGY

## 2021-05-12 PROCEDURE — G0463 HOSPITAL OUTPT CLINIC VISIT: HCPCS

## 2021-05-12 PROCEDURE — 67028 INJECTION EYE DRUG: CPT | Mod: RT | Performed by: OPHTHALMOLOGY

## 2021-05-12 PROCEDURE — 250N000011 HC RX IP 250 OP 636: Performed by: OPHTHALMOLOGY

## 2021-05-12 RX ADMIN — AFLIBERCEPT 2 MG: 40 INJECTION, SOLUTION INTRAVITREAL at 09:46

## 2021-05-12 ASSESSMENT — REFRACTION_WEARINGRX
OD_AXIS: 163
OS_CYLINDER: +0.75
SPECS_TYPE: PAL
OD_ADD: +3.00
OS_ADD: +3.00
OD_CYLINDER: +0.50
OS_AXIS: 0045
OD_SPHERE: -0.50
OS_SPHERE: -1.25

## 2021-05-12 ASSESSMENT — VISUAL ACUITY
CORRECTION_TYPE: GLASSES
METHOD: SNELLEN - LINEAR
OD_CC: 20/25
OS_CC+: -2
OS_PH_CC: 50-2
OS_CC: 20/60
OD_CC+: -2

## 2021-05-12 ASSESSMENT — CONF VISUAL FIELD
OD_NORMAL: 1
METHOD: COUNTING FINGERS
OS_NORMAL: 1

## 2021-05-12 ASSESSMENT — TONOMETRY
IOP_METHOD: TONOPEN
OS_IOP_MMHG: 14
OD_IOP_MMHG: 12

## 2021-05-12 NOTE — NURSING NOTE
Chief Complaints and History of Present Illnesses   Patient presents with     Follow Up     Exudative age-related macular degeneration of right eye with active choroidal neovascularization      Chief Complaint(s) and History of Present Illness(es)     Follow Up     Laterality: right eye    Course: stable    Associated symptoms: Negative for floaters, flashes, eye pain and dryness    Treatments tried: no treatments    Pain scale: 0/10    Comments: Exudative age-related macular degeneration of right eye with active choroidal neovascularization               Comments     Pt states no change in VA since last visit    Erika Mcduffie COT 9:13 AM May 12, 2021

## 2021-05-12 NOTE — PROGRESS NOTES
Chief Complaint(s) and History of Present Illness(es)     Follow Up     Laterality: right eye    Course: stable    Associated symptoms: Negative for floaters, flashes, eye pain and dryness      Treatments tried: no treatments    Pain scale: 0/10    Comments: Exudative age-related macular degeneration of right eye with   active choroidal neovascularization               Comments     Pt states no change in VA since last visit    Erika Mcduffie COT 9:13 AM May 12, 2021               Review of systems for the eyes was negative other than the pertinent positives/negatives listed in the HPI.      Assessment & Plan      Aminta Cortés is a 81 year old female with the following diagnoses:   1. 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 Bilateral dry eyes           NVAMD OD, Nonexudative left eye   Previously failed 10 week extended treatment; now attempting again  Last injection right eye 3/2/2021     Stable OCT right eye today.  Minimal intraretinal fluid temporally overlying choroidal neovascular membrane.  Left eye remains dry  Plan for injection right eye with eylea again today  Continue with Q10 week series and close monitoring (#3/3 today)  AREDS and Amsler to continue   Return precautions reviewed     Patient disposition:   Return in about 10 weeks (around 7/21/2021) for DFE, OCT Macula, OCT-A.           Attending Physician Attestation:  Complete documentation of historical and exam elements from today's encounter can be found in the full encounter summary report (not reduplicated in this progress note).  I personally obtained the chief complaint(s) and history of present illness.  I confirmed and edited as necessary the review of systems, past medical/surgical history, family history, social history,  and examination findings as documented by others; and I examined the patient myself.  I personally reviewed the relevant tests, images, and reports as documented above.  I formulated and edited as necessary the assessment and plan and discussed the findings and management plan with the patient and family. . - Tano Mcduffie MD

## 2021-05-25 ENCOUNTER — RECORDS - HEALTHEAST (OUTPATIENT)
Dept: ADMINISTRATIVE | Facility: CLINIC | Age: 81
End: 2021-05-25

## 2021-07-21 ENCOUNTER — OFFICE VISIT (OUTPATIENT)
Dept: OPHTHALMOLOGY | Facility: CLINIC | Age: 81
End: 2021-07-21
Attending: OPHTHALMOLOGY
Payer: COMMERCIAL

## 2021-07-21 DIAGNOSIS — H35.3211 EXUDATIVE AGE-RELATED MACULAR DEGENERATION OF RIGHT EYE WITH ACTIVE CHOROIDAL NEOVASCULARIZATION (H): Primary | ICD-10-CM

## 2021-07-21 DIAGNOSIS — H35.3124 ADVANCED ATROPHIC NONEXUDATIVE AGE-RELATED MACULAR DEGENERATION OF LEFT EYE WITH SUBFOVEAL INVOLVEMENT: ICD-10-CM

## 2021-07-21 DIAGNOSIS — H04.123 BILATERAL DRY EYES: ICD-10-CM

## 2021-07-21 DIAGNOSIS — Z96.1 PSEUDOPHAKIA OF BOTH EYES: ICD-10-CM

## 2021-07-21 PROCEDURE — 92134 CPTRZ OPH DX IMG PST SGM RTA: CPT | Performed by: OPHTHALMOLOGY

## 2021-07-21 PROCEDURE — 250N000011 HC RX IP 250 OP 636: Performed by: OPHTHALMOLOGY

## 2021-07-21 PROCEDURE — 99214 OFFICE O/P EST MOD 30 MIN: CPT | Mod: 25 | Performed by: OPHTHALMOLOGY

## 2021-07-21 PROCEDURE — 67028 INJECTION EYE DRUG: CPT | Mod: RT | Performed by: OPHTHALMOLOGY

## 2021-07-21 PROCEDURE — G0463 HOSPITAL OUTPT CLINIC VISIT: HCPCS | Mod: 25

## 2021-07-21 PROCEDURE — G0463 HOSPITAL OUTPT CLINIC VISIT: HCPCS

## 2021-07-21 RX ADMIN — AFLIBERCEPT 2 MG: 40 INJECTION, SOLUTION INTRAVITREAL at 10:24

## 2021-07-21 ASSESSMENT — SLIT LAMP EXAM - LIDS
COMMENTS: NORMAL
COMMENTS: NORMAL

## 2021-07-21 ASSESSMENT — TONOMETRY
OS_IOP_MMHG: 15
OD_IOP_MMHG: 15
IOP_METHOD: TONOPEN

## 2021-07-21 ASSESSMENT — CONF VISUAL FIELD
OS_INFERIOR_TEMPORAL_RESTRICTION: 3
OD_NORMAL: 1

## 2021-07-21 ASSESSMENT — VISUAL ACUITY
OS_CC+: -2
OS_CC: 20/60
OD_CC: 20/25
METHOD: SNELLEN - LINEAR
CORRECTION_TYPE: GLASSES

## 2021-07-21 ASSESSMENT — EXTERNAL EXAM - LEFT EYE: OS_EXAM: NORMAL

## 2021-07-21 ASSESSMENT — CUP TO DISC RATIO
OD_RATIO: 0.2
OS_RATIO: 0.2

## 2021-07-21 ASSESSMENT — EXTERNAL EXAM - RIGHT EYE: OD_EXAM: NORMAL

## 2021-07-21 NOTE — PROGRESS NOTES
Chief Complaint(s) and History of Present Illness(es)     Macular Degeneration Follow Up     Laterality: both eyes    Onset: 2 months ago              Comments     Pt. States that she is doing well. No change in VA BE. No pain BE.   Joselyn Centeno COT 9:30 AM July 21, 2021               Review of systems for the eyes was negative other than the pertinent positives/negatives listed in the HPI.      Assessment & Plan      Aminta Cortés is a 81 year old female with the following diagnoses:   1. Exudative age-related macular degeneration of right eye with active choroidal neovascularization (H)    2. 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 Bilateral dry eyes           NVAMD OD, Nonexudative left eye   S/P Eylea x many right eye   Continues at 10 week interval treat and extend  Last injection right eye 5/12/2021     OCT/OCTA today: mild intraretinal fluid temporal macula as before right eye; Left eye with Geographic atrophy/scar, no fluid  Stable visual acuity and minimal choroidal neovascular membrane activity at 10 week treatment interval  Continue Eylea, injection today, return 10 weeks  RBA to intravitreal injection right eye discussed, consent obtained, will proceed today.     AREDS and Amsler to continue   Return precautions reviewed     Continue artificial tears and zaditor as needed     Patient disposition:   Return in about 10 weeks (around 9/29/2021) for VT only, OCT Macula.           Attending Physician Attestation:  Complete documentation of historical and exam elements from today's encounter can be found in the full encounter summary report (not reduplicated in this progress note).  I personally obtained the chief complaint(s) and history of present illness.  I confirmed and edited as necessary the review of systems, past medical/surgical history, family history, social history, and examination  findings as documented by others; and I examined the patient myself.  I personally reviewed the relevant tests, images, and reports as documented above.  I formulated and edited as necessary the assessment and plan and discussed the findings and management plan with the patient and family. Today with Aminta Cortés, I reviewed the indications, risks, benefits, and alternatives of the proposed surgical procedure including, but not limited to, failure obtain the desired result  and need for additional surgery, bleeding, infection, loss of vision, loss of the eye, and the remote possibility of permanent damage to any organ system or death with the use of anesthesia.  I provided multiple opportunities for the questions, answered all questions to the best of my ability, and confirmed that my answers and my discussion were understood.       . - Tano Mcduffie MD

## 2021-07-21 NOTE — NURSING NOTE
Chief Complaints and History of Present Illnesses   Patient presents with     Macular Degeneration Follow Up     Chief Complaint(s) and History of Present Illness(es)     Macular Degeneration Follow Up     Laterality: both eyes    Onset: 2 months ago              Comments     Pt. States that she is doing well. No change in VA BE. No pain BE.   Joselyn Centeno COT 9:30 AM July 21, 2021

## 2021-09-11 ENCOUNTER — HEALTH MAINTENANCE LETTER (OUTPATIENT)
Age: 81
End: 2021-09-11

## 2021-09-27 DIAGNOSIS — H35.3211 EXUDATIVE AGE-RELATED MACULAR DEGENERATION OF RIGHT EYE WITH ACTIVE CHOROIDAL NEOVASCULARIZATION (H): Primary | ICD-10-CM

## 2021-09-29 ENCOUNTER — OFFICE VISIT (OUTPATIENT)
Dept: OPHTHALMOLOGY | Facility: CLINIC | Age: 81
End: 2021-09-29
Attending: OPHTHALMOLOGY
Payer: COMMERCIAL

## 2021-09-29 DIAGNOSIS — H35.3211 EXUDATIVE AGE-RELATED MACULAR DEGENERATION OF RIGHT EYE WITH ACTIVE CHOROIDAL NEOVASCULARIZATION (H): Primary | ICD-10-CM

## 2021-09-29 DIAGNOSIS — H35.3124 ADVANCED ATROPHIC NONEXUDATIVE AGE-RELATED MACULAR DEGENERATION OF LEFT EYE WITH SUBFOVEAL INVOLVEMENT: ICD-10-CM

## 2021-09-29 PROCEDURE — 250N000011 HC RX IP 250 OP 636: Performed by: OPHTHALMOLOGY

## 2021-09-29 PROCEDURE — G0463 HOSPITAL OUTPT CLINIC VISIT: HCPCS

## 2021-09-29 PROCEDURE — 92134 CPTRZ OPH DX IMG PST SGM RTA: CPT | Performed by: OPHTHALMOLOGY

## 2021-09-29 PROCEDURE — 67028 INJECTION EYE DRUG: CPT | Mod: RT | Performed by: OPHTHALMOLOGY

## 2021-09-29 RX ADMIN — AFLIBERCEPT 2 MG: 40 INJECTION, SOLUTION INTRAVITREAL at 11:25

## 2021-09-29 ASSESSMENT — REFRACTION_WEARINGRX
OD_CYLINDER: +0.50
OD_SPHERE: -0.50
SPECS_TYPE: PAL
OD_AXIS: 163
OS_AXIS: 0045
OS_ADD: +3.00
OD_ADD: +3.00
OS_SPHERE: -1.25
OS_CYLINDER: +0.75

## 2021-09-29 ASSESSMENT — VISUAL ACUITY
CORRECTION_TYPE: GLASSES
OD_PH_CC+: -1+2
METHOD: SNELLEN - LINEAR
OS_CC: 20/60
OS_PH_CC: 20/40
OS_PH_CC+: -2+2
OD_PH_CC: 20/25
OD_CC: 20/40
OS_CC+: +2
OD_CC+: -2

## 2021-09-29 ASSESSMENT — CONF VISUAL FIELD
METHOD: COUNTING FINGERS
OD_NORMAL: 1
OS_NORMAL: 1

## 2021-09-29 ASSESSMENT — TONOMETRY
OS_IOP_MMHG: 15
OD_IOP_MMHG: 14
IOP_METHOD: TONOPEN

## 2021-09-29 NOTE — PROGRESS NOTES
Chief Complaint(s) and History of Present Illness(es)     Macular Degeneration Follow Up     Laterality: both eyes    Course: stable    Associated symptoms: Negative for flashes, floaters and eye pain    Pain scale: 0/10              Comments     2 month follow up AMD, both eyes    Pt states vision is stable both eyes since last visit.  Ocular meds: None    Julisa Joyce OT 10:43 AM September 29, 2021               Review of systems for the eyes was negative other than the pertinent positives/negatives listed in the HPI.      Assessment & Plan      mAinta Cortés is a 81 year old female with the following diagnoses:   1. Exudative age-related macular degeneration of right eye with active choroidal neovascularization (H)    2. 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   AREDS and Amsler to continue   Return precautions reviewed       Patient disposition:   Return in about 10 weeks (around 12/8/2021) for VT only, OCT Macula.           Attending Physician Attestation:  Complete documentation of historical and exam elements from today's encounter can be found in the full encounter summary report (not reduplicated in this progress note).  I personally obtained the chief complaint(s) and history of present illness.  I confirmed and edited as necessary the review of systems, past medical/surgical history, family history, social history, and examination findings as documented by others; and I examined the patient myself.  I personally  reviewed the relevant tests, images, and reports as documented above.  I formulated and edited as necessary the assessment and plan and discussed the findings and management plan with the patient and family. . - Tano Mcduffie MD

## 2021-09-29 NOTE — NURSING NOTE
Chief Complaints and History of Present Illnesses   Patient presents with     Macular Degeneration Follow Up     Chief Complaint(s) and History of Present Illness(es)     Macular Degeneration Follow Up     Laterality: both eyes    Course: stable    Associated symptoms: Negative for flashes, floaters and eye pain    Pain scale: 0/10              Comments     2 month follow up AMD, both eyes    Pt states vision is stable both eyes since last visit.  Ocular meds: None    Julisa Joyce OT 10:43 AM September 29, 2021

## 2021-11-30 DIAGNOSIS — H35.3211 EXUDATIVE AGE-RELATED MACULAR DEGENERATION OF RIGHT EYE WITH ACTIVE CHOROIDAL NEOVASCULARIZATION (H): ICD-10-CM

## 2021-11-30 DIAGNOSIS — H35.3124 ADVANCED ATROPHIC NONEXUDATIVE AGE-RELATED MACULAR DEGENERATION OF LEFT EYE WITH SUBFOVEAL INVOLVEMENT: Primary | ICD-10-CM

## 2021-12-08 ENCOUNTER — OFFICE VISIT (OUTPATIENT)
Dept: OPHTHALMOLOGY | Facility: CLINIC | Age: 81
End: 2021-12-08
Attending: OPHTHALMOLOGY
Payer: COMMERCIAL

## 2021-12-08 DIAGNOSIS — H35.3124 ADVANCED ATROPHIC NONEXUDATIVE AGE-RELATED MACULAR DEGENERATION OF LEFT EYE WITH SUBFOVEAL INVOLVEMENT: ICD-10-CM

## 2021-12-08 DIAGNOSIS — H35.3211 EXUDATIVE AGE-RELATED MACULAR DEGENERATION OF RIGHT EYE WITH ACTIVE CHOROIDAL NEOVASCULARIZATION (H): Primary | ICD-10-CM

## 2021-12-08 PROCEDURE — 250N000011 HC RX IP 250 OP 636: Performed by: OPHTHALMOLOGY

## 2021-12-08 PROCEDURE — 67028 INJECTION EYE DRUG: CPT | Mod: RT | Performed by: OPHTHALMOLOGY

## 2021-12-08 PROCEDURE — G0463 HOSPITAL OUTPT CLINIC VISIT: HCPCS | Mod: 25

## 2021-12-08 PROCEDURE — 92134 CPTRZ OPH DX IMG PST SGM RTA: CPT | Performed by: OPHTHALMOLOGY

## 2021-12-08 RX ADMIN — AFLIBERCEPT 2 MG: 40 INJECTION, SOLUTION INTRAVITREAL at 11:14

## 2021-12-08 ASSESSMENT — REFRACTION_WEARINGRX
OD_AXIS: 163
OD_SPHERE: -0.50
OS_CYLINDER: +0.75
OS_SPHERE: -1.25
OD_ADD: +3.00
OS_AXIS: 0045
OD_CYLINDER: +0.50
OS_ADD: +3.00
SPECS_TYPE: PAL

## 2021-12-08 ASSESSMENT — CONF VISUAL FIELD
OS_NORMAL: 1
METHOD: COUNTING FINGERS
OD_NORMAL: 1

## 2021-12-08 ASSESSMENT — VISUAL ACUITY
OD_CC: 20/25-/+
OS_CC: 20/50-
CORRECTION_TYPE: GLASSES
METHOD: SNELLEN - LINEAR

## 2021-12-08 ASSESSMENT — TONOMETRY
OD_IOP_MMHG: 10
OS_IOP_MMHG: 10
IOP_METHOD: ICARE

## 2021-12-09 NOTE — PROGRESS NOTES
Chief Complaint(s) and History of Present Illness(es)     Macular Degeneration Follow Up     Laterality: both eyes    Onset: 2 months ago    Location: central vision    Quality: blurred vision    Severity: mild    Frequency: constantly    Timing: throughout the day    Context: near vision (Left eye is slowly getting worse for near vision)    Course: stable    Associated symptoms: Negative for floaters and flashes    Pain scale: 0/10              Comments     Here for AMD follow up with injection right eye today  No new concerns per patient    RAFY Leroy 10:07 AM 12/08/2021              Review of systems for the eyes was negative other than the pertinent positives/negatives listed in the HPI.      Assessment & Plan      Aminta Cortés is a 81 year old female with the following diagnoses:   1. Exudative age-related macular degeneration of right eye with active choroidal neovascularization (H)    2. 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   AREDS and Amsler to continue   Return precautions reviewed     Patient disposition:   Return in about 10 weeks (around 2/16/2022) for DFE, OCT Macula.           Attending Physician Attestation:  Complete documentation of historical and exam elements from today's encounter can be found in the full encounter summary report (not reduplicated in this progress note).  I personally obtained the chief complaint(s) and history of present illness.  I confirmed and edited as necessary the review of systems, past medical/surgical history, family history, social history, and  examination findings as documented by others; and I examined the patient myself.  I personally reviewed the relevant tests, images, and reports as documented above.  I formulated and edited as necessary the assessment and plan and discussed the findings and management plan with the patient and family. . - Tano Mcduffie MD

## 2022-02-08 DIAGNOSIS — H35.3211 EXUDATIVE AGE-RELATED MACULAR DEGENERATION OF RIGHT EYE WITH ACTIVE CHOROIDAL NEOVASCULARIZATION (H): Primary | ICD-10-CM

## 2022-02-16 ENCOUNTER — OFFICE VISIT (OUTPATIENT)
Dept: OPHTHALMOLOGY | Facility: CLINIC | Age: 82
End: 2022-02-16
Attending: OPHTHALMOLOGY
Payer: COMMERCIAL

## 2022-02-16 DIAGNOSIS — H26.493 PCO (POSTERIOR CAPSULAR OPACIFICATION), BILATERAL: ICD-10-CM

## 2022-02-16 DIAGNOSIS — Z96.1 PSEUDOPHAKIA OF BOTH EYES: Primary | ICD-10-CM

## 2022-02-16 DIAGNOSIS — H35.3211 EXUDATIVE AGE-RELATED MACULAR DEGENERATION OF RIGHT EYE WITH ACTIVE CHOROIDAL NEOVASCULARIZATION (H): ICD-10-CM

## 2022-02-16 DIAGNOSIS — H35.3124 ADVANCED ATROPHIC NONEXUDATIVE AGE-RELATED MACULAR DEGENERATION OF LEFT EYE WITH SUBFOVEAL INVOLVEMENT: ICD-10-CM

## 2022-02-16 PROCEDURE — 250N000011 HC RX IP 250 OP 636: Performed by: OPHTHALMOLOGY

## 2022-02-16 PROCEDURE — G0463 HOSPITAL OUTPT CLINIC VISIT: HCPCS

## 2022-02-16 PROCEDURE — 67028 INJECTION EYE DRUG: CPT | Mod: RT | Performed by: OPHTHALMOLOGY

## 2022-02-16 PROCEDURE — 92134 CPTRZ OPH DX IMG PST SGM RTA: CPT | Performed by: OPHTHALMOLOGY

## 2022-02-16 RX ADMIN — AFLIBERCEPT 2 MG: 40 INJECTION, SOLUTION INTRAVITREAL at 10:57

## 2022-02-16 ASSESSMENT — CONF VISUAL FIELD
METHOD: COUNTING FINGERS
OD_NORMAL: 1
OS_NORMAL: 1

## 2022-02-16 ASSESSMENT — REFRACTION_WEARINGRX
OS_AXIS: 0045
OD_AXIS: 163
OS_ADD: +3.00
OS_CYLINDER: +0.75
OD_SPHERE: -0.50
OD_CYLINDER: +0.50
OD_ADD: +3.00
OS_SPHERE: -1.25
SPECS_TYPE: PAL

## 2022-02-16 ASSESSMENT — VISUAL ACUITY
METHOD: SNELLEN - LINEAR
OS_CC: 20/60
OD_CC: 20/30
CORRECTION_TYPE: GLASSES
OD_CC+: +2
OS_CC+: +1

## 2022-02-16 ASSESSMENT — EXTERNAL EXAM - LEFT EYE: OS_EXAM: NORMAL

## 2022-02-16 ASSESSMENT — EXTERNAL EXAM - RIGHT EYE: OD_EXAM: NORMAL

## 2022-02-16 ASSESSMENT — SLIT LAMP EXAM - LIDS
COMMENTS: NORMAL
COMMENTS: NORMAL

## 2022-02-16 ASSESSMENT — TONOMETRY
IOP_METHOD: TONOPEN
OD_IOP_MMHG: 11
OS_IOP_MMHG: 13

## 2022-02-16 ASSESSMENT — CUP TO DISC RATIO
OS_RATIO: 0.2
OD_RATIO: 0.2

## 2022-02-16 NOTE — PROGRESS NOTES
Chief Complaint(s) and History of Present Illness(es)     Macular Degeneration Follow Up     Laterality: both eyes    Associated symptoms: floaters.  Negative for flashes and dryness    Pain scale: 0/10              Comments     Macular degeneration follow up.    The patient has a new right floater since last visit.  She states it was   very annoying but she notices the right eye floater less now.  The patient uses OTC  eye drops for itchy eyes as needed.  Maria E Hernandez, COA, COA 10:00 AM 02/16/2022               Review of systems for the eyes was negative other than the pertinent positives/negatives listed in the HPI.      Assessment & Plan      Aminta Niurka Cortés is a 82 year old female with the following diagnoses:   1. Pseudophakia of both eyes - Both Eyes    2. 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 PCO (posterior capsular opacification), bilateral           Here today for dilated fundus exam   Stable vision per patient  NVAMD OD, Nonexudative left eye   S/P Eylea x many right eye   Stable with 10 week intervals  Last injection right eye 12/9/2021    Dilated fundus exam looks stable overall  posterior capsular opacity (PCO) worsening right eye, nearing visually significant   Return precautions reviewed    Stable OCT with minimal intraretinal fluid   Recommend continued Eylea injections for the right eye every 10 weeks    RBA to intravitreal injection right eye discussed, consent obtained, will proceed today.   AREBLADIMIR and Autumn to continue       Patient disposition:   Return in about 10 weeks (around 4/27/2022) for VT only, OCT Macula; Eylea RIGHT.           Attending Physician Attestation:  Complete documentation of historical and exam elements from today's encounter can be found in the full encounter summary report (not reduplicated  in this progress note).  I personally obtained the chief complaint(s) and history of present illness.  I confirmed and edited as necessary the review of systems, past medical/surgical history, family history, social history, and examination findings as documented by others; and I examined the patient myself.  I personally reviewed the relevant tests, images, and reports as documented above.  I formulated and edited as necessary the assessment and plan and discussed the findings and management plan with the patient and family. . - Tano Mcduffie MD

## 2022-02-16 NOTE — NURSING NOTE
Chief Complaints and History of Present Illnesses   Patient presents with     Macular Degeneration Follow Up     Chief Complaint(s) and History of Present Illness(es)     Macular Degeneration Follow Up     Laterality: both eyes    Associated symptoms: floaters.  Negative for flashes and dryness    Pain scale: 0/10              Comments     Macular degeneration follow up.    The patient has a new right floater since last visit.  She states it was very annoying but she notices the right eye floater less now.  The patient uses OTC  eye drops for itchy eyes as needed.  Maria E Hernandez, COA, COA 10:00 AM 02/16/2022

## 2022-04-23 ENCOUNTER — HEALTH MAINTENANCE LETTER (OUTPATIENT)
Age: 82
End: 2022-04-23

## 2022-04-25 DIAGNOSIS — H35.3211 EXUDATIVE AGE-RELATED MACULAR DEGENERATION OF RIGHT EYE WITH ACTIVE CHOROIDAL NEOVASCULARIZATION (H): Primary | ICD-10-CM

## 2022-04-27 ENCOUNTER — OFFICE VISIT (OUTPATIENT)
Dept: OPHTHALMOLOGY | Facility: CLINIC | Age: 82
End: 2022-04-27
Attending: OPHTHALMOLOGY
Payer: COMMERCIAL

## 2022-04-27 DIAGNOSIS — H35.3124 ADVANCED ATROPHIC NONEXUDATIVE AGE-RELATED MACULAR DEGENERATION OF LEFT EYE WITH SUBFOVEAL INVOLVEMENT: Primary | ICD-10-CM

## 2022-04-27 DIAGNOSIS — H35.3211 EXUDATIVE AGE-RELATED MACULAR DEGENERATION OF RIGHT EYE WITH ACTIVE CHOROIDAL NEOVASCULARIZATION (H): ICD-10-CM

## 2022-04-27 DIAGNOSIS — Z96.1 PSEUDOPHAKIA OF BOTH EYES: ICD-10-CM

## 2022-04-27 PROCEDURE — G0463 HOSPITAL OUTPT CLINIC VISIT: HCPCS | Mod: 25

## 2022-04-27 PROCEDURE — 250N000011 HC RX IP 250 OP 636: Performed by: OPHTHALMOLOGY

## 2022-04-27 PROCEDURE — 92134 CPTRZ OPH DX IMG PST SGM RTA: CPT | Performed by: OPHTHALMOLOGY

## 2022-04-27 PROCEDURE — 67028 INJECTION EYE DRUG: CPT | Mod: RT | Performed by: OPHTHALMOLOGY

## 2022-04-27 RX ADMIN — AFLIBERCEPT 2 MG: 40 INJECTION, SOLUTION INTRAVITREAL at 11:15

## 2022-04-27 ASSESSMENT — VISUAL ACUITY
METHOD: SNELLEN - LINEAR
OS_CC: 20/60-
OD_CC: 20/30-

## 2022-04-27 ASSESSMENT — TONOMETRY
OD_IOP_MMHG: 12
IOP_METHOD: TONOPEN
OS_IOP_MMHG: 12

## 2022-04-27 NOTE — NURSING NOTE
Chief Complaints and History of Present Illnesses   Patient presents with     Macular Degeneration Follow Up     Chief Complaint(s) and History of Present Illness(es)     Macular Degeneration Follow Up     Laterality: right eye    Frequency: constantly    Timing: throughout the day              Comments     Scheduled for probable Avastin injection to RE today.  Pt is under impression that her insurance will not cover today's injection.  Eye testing will not be done until further notice regarding if today's visit will go forth.  Lisa Gonzalez, RAFY COT 10:41 AM 04/27/2022

## 2022-04-27 NOTE — PROGRESS NOTES
Chief Complaint(s) and History of Present Illness(es)     Macular Degeneration Follow Up     Laterality: right eye    Frequency: constantly    Timing: throughout the day              Comments     Scheduled for probable Avastin injection to RE today.  Pt is under impression that her insurance will not cover today's   injection.  Eye testing will not be done until further notice regarding if today's   visit will go forth.  Adia is making calls to obtain more information about this.  Lisa Gonzalez, COT COT 10:41 AM 04/27/2022                    Review of systems for the eyes was negative other than the pertinent positives/negatives listed in the HPI.      Assessment & Plan      Aminta Niurka Cortés is a 82 year old female with the following diagnoses:   1. 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   MANNY and Autumn to continue       Patient disposition:   Return in about 10 weeks (around 7/6/2022) for VT only, OCT Macula.           Attending Physician Attestation:  Complete documentation of historical and exam elements from today's encounter can be found in the full encounter summary report (not reduplicated in this progress note).  I personally obtained the chief complaint(s) and history of present  illness.  I confirmed and edited as necessary the review of systems, past medical/surgical history, family history, social history, and examination findings as documented by others; and I examined the patient myself.  I personally reviewed the relevant tests, images, and reports as documented above.  I formulated and edited as necessary the assessment and plan and discussed the findings and management plan with the patient and family. . - Tnao Mcduffie MD

## 2022-04-27 NOTE — NURSING NOTE
Chief Complaints and History of Present Illnesses   Patient presents with     Macular Degeneration Follow Up     Chief Complaint(s) and History of Present Illness(es)     Macular Degeneration Follow Up     Laterality: right eye    Frequency: constantly    Timing: throughout the day              Comments     Scheduled for probable Avastin injection to RE today.  Pt is under impression that her insurance will not cover today's injection.  Eye testing will not be done until further notice regarding if today's visit will go forth.  Adia is making calls to obtain more information about this.  Lisa Gonzalez, RAFY COT 10:41 AM 04/27/2022

## 2022-06-30 ENCOUNTER — OFFICE VISIT (OUTPATIENT)
Dept: OPHTHALMOLOGY | Facility: CLINIC | Age: 82
End: 2022-06-30
Attending: OPHTHALMOLOGY
Payer: COMMERCIAL

## 2022-06-30 DIAGNOSIS — H35.3221 EXUDATIVE AGE-RELATED MACULAR DEGENERATION OF LEFT EYE WITH ACTIVE CHOROIDAL NEOVASCULARIZATION (H): ICD-10-CM

## 2022-06-30 DIAGNOSIS — H35.3212 EXUDATIVE AGE-RELATED MACULAR DEGENERATION OF RIGHT EYE WITH INACTIVE CHOROIDAL NEOVASCULARIZATION (H): ICD-10-CM

## 2022-06-30 DIAGNOSIS — Z96.1 PSEUDOPHAKIA OF BOTH EYES: ICD-10-CM

## 2022-06-30 DIAGNOSIS — H35.3211 EXUDATIVE AGE-RELATED MACULAR DEGENERATION OF RIGHT EYE WITH ACTIVE CHOROIDAL NEOVASCULARIZATION (H): Primary | ICD-10-CM

## 2022-06-30 DIAGNOSIS — H35.3124 ADVANCED ATROPHIC NONEXUDATIVE AGE-RELATED MACULAR DEGENERATION OF LEFT EYE WITH SUBFOVEAL INVOLVEMENT: ICD-10-CM

## 2022-06-30 PROCEDURE — 250N000011 HC RX IP 250 OP 636: Performed by: OPHTHALMOLOGY

## 2022-06-30 PROCEDURE — G0463 HOSPITAL OUTPT CLINIC VISIT: HCPCS | Mod: 25

## 2022-06-30 PROCEDURE — 67028 INJECTION EYE DRUG: CPT | Mod: LT | Performed by: OPHTHALMOLOGY

## 2022-06-30 PROCEDURE — 67028 INJECTION EYE DRUG: CPT | Mod: RT | Performed by: OPHTHALMOLOGY

## 2022-06-30 PROCEDURE — 92134 CPTRZ OPH DX IMG PST SGM RTA: CPT | Performed by: OPHTHALMOLOGY

## 2022-06-30 RX ADMIN — AFLIBERCEPT 2 MG: 40 INJECTION, SOLUTION INTRAVITREAL at 11:56

## 2022-06-30 RX ADMIN — AFLIBERCEPT 2 MG: 40 INJECTION, SOLUTION INTRAVITREAL at 11:55

## 2022-06-30 ASSESSMENT — CONF VISUAL FIELD
OD_NORMAL: 1
OS_NORMAL: 1
METHOD: COUNTING FINGERS

## 2022-06-30 ASSESSMENT — VISUAL ACUITY
METHOD: SNELLEN - LINEAR
OD_CC+: -2
CORRECTION_TYPE: GLASSES
OS_CC+: +2
OD_PH_CC: 20/25
OS_CC: 20/60
OD_PH_CC+: -2
OD_CC: 20/30

## 2022-06-30 ASSESSMENT — REFRACTION_WEARINGRX
OS_SPHERE: -1.25
SPECS_TYPE: PAL
OD_AXIS: 163
OS_CYLINDER: +0.75
OD_ADD: +3.00
OD_CYLINDER: +0.50
OS_AXIS: 0045
OD_SPHERE: -0.50
OS_ADD: +3.00

## 2022-06-30 ASSESSMENT — TONOMETRY
IOP_METHOD: ICARE
OD_IOP_MMHG: 10
OS_IOP_MMHG: 10

## 2022-06-30 ASSESSMENT — EXTERNAL EXAM - LEFT EYE: OS_EXAM: NORMAL

## 2022-06-30 ASSESSMENT — SLIT LAMP EXAM - LIDS
COMMENTS: NORMAL
COMMENTS: NORMAL

## 2022-06-30 ASSESSMENT — EXTERNAL EXAM - RIGHT EYE: OD_EXAM: NORMAL

## 2022-06-30 NOTE — NURSING NOTE
Chief Complaints and History of Present Illnesses   Patient presents with     Macular Degeneration Follow Up           Chief Complaint(s) and History of Present Illness(es)     Macular Degeneration Follow Up     Laterality: right eye    Frequency: constantly    Timing: throughout the day    Associated symptoms: Negative for eye pain    Comments:                Comments     Pt here today for mac degeneration follow up.  States nothing to report about eyes.  Pt states she is here for an injection today.    Tez VILLALOBOS, DONAVON 10:35 AM 06/30/2022

## 2022-06-30 NOTE — PROGRESS NOTES
Chief Complaint(s) and History of Present Illness(es)     Macular Degeneration Follow Up     Laterality: right eye    Frequency: constantly    Timing: throughout the day    Associated symptoms: Negative for eye pain    Comments:                Comments     Pt here today for mac degeneration follow up.  States nothing to report about             Review of systems for the eyes was negative other than the pertinent positives/negatives listed in the HPI.      Assessment & Plan      Aminta Cortés is a 82 year old female with the following diagnoses:   1. Exudative age-related macular degeneration of right eye with active choroidal neovascularization (H)    2. 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 Pseudophakia of both eyes - Both Eyes         Age related macular degeneration recheck   Stable vision per patient  NVAMD OD, Nonexudative left eye   S/P Eylea x many right eye   Stable with 10 week intervals  Last injection right eye 4/27/2022     Stable OCT right eye   Left eye with  NEW Subretinal fluid and choroidal neovascular membrane    Recommend continued Eylea injections for the right eye every 10 weeks  Discussed new findings left eye; recommend starting Q4week treatment with Eylea left eye now      RBA to intravitreal Eylea injection both eyes discussed, consent obtained, will proceed today.   MANNY and Autumn to continue       Patient disposition:   Return in about 4 weeks (around 7/28/2022) for VT only, Eylea LEFT EYE.           Attending Physician Attestation:  Complete documentation of historical and exam elements from today's encounter can be found in the full encounter summary report (not reduplicated in this progress note).  I personally obtained the chief complaint(s) and history of present illness.  I confirmed and edited as necessary the review of systems, past medical/surgical history, family history, social history, and examination  findings as documented by others; and I examined the patient myself.  I personally reviewed the relevant tests, images, and reports as documented above.  I formulated and edited as necessary the assessment and plan and discussed the findings and management plan with the patient and family. . - Tano Mcduffie MD

## 2022-08-04 ENCOUNTER — OFFICE VISIT (OUTPATIENT)
Dept: OPHTHALMOLOGY | Facility: CLINIC | Age: 82
End: 2022-08-04
Attending: OPHTHALMOLOGY
Payer: COMMERCIAL

## 2022-08-04 DIAGNOSIS — H35.3221 EXUDATIVE AGE-RELATED MACULAR DEGENERATION OF LEFT EYE WITH ACTIVE CHOROIDAL NEOVASCULARIZATION (H): Primary | ICD-10-CM

## 2022-08-04 PROCEDURE — G0463 HOSPITAL OUTPT CLINIC VISIT: HCPCS | Mod: 25

## 2022-08-04 PROCEDURE — 250N000011 HC RX IP 250 OP 636: Performed by: OPHTHALMOLOGY

## 2022-08-04 PROCEDURE — 67028 INJECTION EYE DRUG: CPT | Mod: LT | Performed by: OPHTHALMOLOGY

## 2022-08-04 PROCEDURE — 999N000103 HC STATISTIC NO CHARGE FACILITY FEE

## 2022-08-04 RX ADMIN — AFLIBERCEPT 2 MG: 40 INJECTION, SOLUTION INTRAVITREAL at 13:59

## 2022-08-04 ASSESSMENT — VISUAL ACUITY
CORRECTION_TYPE: GLASSES
OS_CC: 20/60
OD_PH_CC: 20/25
METHOD: SNELLEN - LINEAR
OD_CC: 20/30
OD_PH_CC+: -3
OD_CC+: +1

## 2022-08-04 ASSESSMENT — TONOMETRY
OS_IOP_MMHG: 11
OD_IOP_MMHG: 9
IOP_METHOD: ICARE

## 2022-08-04 ASSESSMENT — REFRACTION_WEARINGRX
OS_SPHERE: -1.25
OD_AXIS: 163
SPECS_TYPE: PAL
OD_SPHERE: -0.50
OD_ADD: +3.00
OD_CYLINDER: +0.50
OS_CYLINDER: +0.75
OS_ADD: +3.00
OS_AXIS: 0045

## 2022-08-04 ASSESSMENT — CONF VISUAL FIELD
OD_NORMAL: 1
OS_NORMAL: 1
METHOD: COUNTING FINGERS

## 2022-08-04 NOTE — PROGRESS NOTES
Chief Complaint(s) and History of Present Illness(es)     Follow Up               Comments     Patient states that her vision is the same since she was here last. No   pain and irritation. No flashes of light. NO floaters.     Ocular Meds:none     Nestor Mukherjee RAFY, August 4, 2022 1:38 PM                  Review of systems for the eyes was negative other than the pertinent positives/negatives listed in the HPI.      Assessment & Plan      Aminta Cortés is a 82 year old female with the following diagnoses:   1. Exudative age-related macular degeneration of left eye with active choroidal neovascularization (H)         S/P Eylea x many right eye (Stable with 10 week intervals)  Last injection right eye 6/30/2022    Noted to have new choroidal neovascular membrane c subretinal fluid left eye last visit  S/P Eylea x 1 6/30/22     Recommend continued Eylea injections for the right eye every 10 weeks  Here for Eylea left eye today #2/3  RBA to intravitreal Eylea injection discussed, consent obtained, will proceed today.   MANNY and Autumn to continue       Patient disposition:   Return in about 4 weeks (around 9/1/2022) for VT only; Eylea both eyes.           Attending Physician Attestation:  Complete documentation of historical and exam elements from today's encounter can be found in the full encounter summary report (not reduplicated in this progress note).  I personally obtained the chief complaint(s) and history of present illness.  I confirmed and edited as necessary the review of systems, past medical/surgical history, family history, social history, and examination findings as documented by others; and I examined the patient myself.  I personally reviewed the relevant tests, images, and reports as documented above.  I formulated and edited as necessary the assessment and plan and discussed the findings and management plan with the patient and family. . - Tano Mcduffie MD

## 2022-08-04 NOTE — NURSING NOTE
Chief Complaints and History of Present Illnesses   Patient presents with     Follow Up     Chief Complaint(s) and History of Present Illness(es)     Follow Up               Comments     Patient states that her vision is the same since she was here last. No pain and irritation. No flashes of light. NO floaters.     Ocular Meds:none     Nestor Mukherjee COT, August 4, 2022 1:38 PM

## 2022-09-07 ENCOUNTER — OFFICE VISIT (OUTPATIENT)
Dept: OPHTHALMOLOGY | Facility: CLINIC | Age: 82
End: 2022-09-07
Attending: OPHTHALMOLOGY
Payer: COMMERCIAL

## 2022-09-07 DIAGNOSIS — H35.3221 EXUDATIVE AGE-RELATED MACULAR DEGENERATION OF LEFT EYE WITH ACTIVE CHOROIDAL NEOVASCULARIZATION (H): Primary | ICD-10-CM

## 2022-09-07 DIAGNOSIS — H35.3211 EXUDATIVE AGE-RELATED MACULAR DEGENERATION OF RIGHT EYE WITH ACTIVE CHOROIDAL NEOVASCULARIZATION (H): ICD-10-CM

## 2022-09-07 DIAGNOSIS — Z96.1 PSEUDOPHAKIA OF BOTH EYES: ICD-10-CM

## 2022-09-07 PROCEDURE — G0463 HOSPITAL OUTPT CLINIC VISIT: HCPCS | Mod: 25

## 2022-09-07 PROCEDURE — 67028 INJECTION EYE DRUG: CPT | Mod: LT | Performed by: OPHTHALMOLOGY

## 2022-09-07 PROCEDURE — 250N000011 HC RX IP 250 OP 636: Performed by: OPHTHALMOLOGY

## 2022-09-07 PROCEDURE — 67028 INJECTION EYE DRUG: CPT | Mod: RT | Performed by: OPHTHALMOLOGY

## 2022-09-07 RX ADMIN — AFLIBERCEPT 2 MG: 40 INJECTION, SOLUTION INTRAVITREAL at 15:44

## 2022-09-07 ASSESSMENT — REFRACTION_WEARINGRX
OD_ADD: +3.00
OS_SPHERE: -1.25
SPECS_TYPE: PAL
OS_ADD: +3.00
OD_AXIS: 163
OD_CYLINDER: +0.50
OD_SPHERE: -0.50
OS_AXIS: 0045
OS_CYLINDER: +0.75

## 2022-09-07 ASSESSMENT — TONOMETRY
IOP_METHOD: ICARE
OD_IOP_MMHG: 10
OS_IOP_MMHG: 13

## 2022-09-07 ASSESSMENT — CONF VISUAL FIELD
OD_NORMAL: 1
OS_NORMAL: 1
METHOD: COUNTING FINGERS

## 2022-09-07 ASSESSMENT — VISUAL ACUITY
METHOD: SNELLEN - LINEAR
OS_CC: 20/50
CORRECTION_TYPE: GLASSES
OD_CC+: +2
OS_CC+: +2
OD_CC: 20/30

## 2022-09-07 NOTE — PROGRESS NOTES
Chief Complaint(s) and History of Present Illness(es)     Follow Up               Comments     Patient states that her vision is the same since she was here last. No   pain and irritation. No flashes of light. No floaters.     Ocular Meds:none     Nestor Mukherjee RAFY, September 7, 2022 2:33 PM                  Review of systems for the eyes was negative other than the pertinent positives/negatives listed in the HPI.      Assessment & Plan      Aminta Cortés is a 82 year old female with the following diagnoses:   1. 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 Pseudophakia of both eyes - Both Eyes         S/P Eylea x many right eye (Stable with 10 week intervals)  Last injection right eye 6/30/2022     Noted to have new choroidal neovascular membrane c subretinal fluid left eye last visit  S/P Eylea 6/30/22, 8/4/2022     Recommend continued Eylea injections for the right eye every 10 weeks  Bilateral injections today - RBA reviewed, consent obtained  Left eye #3/3 today at Q4 week interval  AREDS and Autumn to continue       Patient disposition:   Return in about 4 weeks (around 10/5/2022) for VT only, OCT Macula.           Attending Physician Attestation:  Complete documentation of historical and exam elements from today's encounter can be found in the full encounter summary report (not reduplicated in this progress note).  I personally obtained the chief complaint(s) and history of present illness.  I confirmed and edited as necessary the review of systems, past medical/surgical history, family history, social history, and examination findings as documented by others; and I examined the patient myself.  I personally reviewed the relevant tests, images, and reports as documented above.  I formulated and edited as necessary the assessment and plan and  discussed the findings and management plan with the patient and family. . - Tano Mcduffie MD

## 2022-09-07 NOTE — NURSING NOTE
Chief Complaints and History of Present Illnesses   Patient presents with     Follow Up     Chief Complaint(s) and History of Present Illness(es)     Follow Up               Comments     Patient states that her vision is the same since she was here last. No pain and irritation. No flashes of light. No floaters.     Ocular Meds:none     Nestor Mukherjee COT, September 7, 2022 2:33 PM

## 2022-09-29 DIAGNOSIS — H35.3211 EXUDATIVE AGE-RELATED MACULAR DEGENERATION OF RIGHT EYE WITH ACTIVE CHOROIDAL NEOVASCULARIZATION (H): ICD-10-CM

## 2022-09-29 DIAGNOSIS — H35.3221 EXUDATIVE AGE-RELATED MACULAR DEGENERATION OF LEFT EYE WITH ACTIVE CHOROIDAL NEOVASCULARIZATION (H): Primary | ICD-10-CM

## 2022-10-05 ENCOUNTER — OFFICE VISIT (OUTPATIENT)
Dept: OPHTHALMOLOGY | Facility: CLINIC | Age: 82
End: 2022-10-05
Attending: OPHTHALMOLOGY
Payer: COMMERCIAL

## 2022-10-05 DIAGNOSIS — Z96.1 PSEUDOPHAKIA OF BOTH EYES: ICD-10-CM

## 2022-10-05 DIAGNOSIS — H00.11 CHALAZION OF RIGHT UPPER EYELID: ICD-10-CM

## 2022-10-05 DIAGNOSIS — H26.493 PCO (POSTERIOR CAPSULAR OPACIFICATION), BILATERAL: ICD-10-CM

## 2022-10-05 DIAGNOSIS — H35.3211 EXUDATIVE AGE-RELATED MACULAR DEGENERATION OF RIGHT EYE WITH ACTIVE CHOROIDAL NEOVASCULARIZATION (H): ICD-10-CM

## 2022-10-05 DIAGNOSIS — H35.3221 EXUDATIVE AGE-RELATED MACULAR DEGENERATION OF LEFT EYE WITH ACTIVE CHOROIDAL NEOVASCULARIZATION (H): Primary | ICD-10-CM

## 2022-10-05 PROCEDURE — 99213 OFFICE O/P EST LOW 20 MIN: CPT | Performed by: OPHTHALMOLOGY

## 2022-10-05 PROCEDURE — G0463 HOSPITAL OUTPT CLINIC VISIT: HCPCS | Mod: 25

## 2022-10-05 ASSESSMENT — EXTERNAL EXAM - LEFT EYE: OS_EXAM: NORMAL

## 2022-10-05 ASSESSMENT — REFRACTION_WEARINGRX
OS_SPHERE: -1.25
OS_ADD: +3.00
OS_CYLINDER: +0.75
OD_CYLINDER: +0.50
OD_AXIS: 163
OD_ADD: +3.00
SPECS_TYPE: PAL
OS_AXIS: 0045
OD_SPHERE: -0.50

## 2022-10-05 ASSESSMENT — VISUAL ACUITY
OS_CC: 20/60
METHOD: SNELLEN - LINEAR
CORRECTION_TYPE: GLASSES
OS_CC+: -1
OD_CC: 20/25
OD_CC+: -2

## 2022-10-05 ASSESSMENT — TONOMETRY
OD_IOP_MMHG: 8
OS_IOP_MMHG: 12
IOP_METHOD: TONOPEN

## 2022-10-05 ASSESSMENT — CONF VISUAL FIELD
OD_NORMAL: 1
METHOD: COUNTING FINGERS
OS_NORMAL: 1

## 2022-10-05 ASSESSMENT — SLIT LAMP EXAM - LIDS: COMMENTS: MGD

## 2022-10-05 ASSESSMENT — EXTERNAL EXAM - RIGHT EYE: OD_EXAM: NORMAL

## 2022-10-05 NOTE — PROGRESS NOTES
Chief Complaint(s) and History of Present Illness(es)     Macular Degeneration Follow Up     Laterality: both eyes    Course: stable    Associated symptoms: Negative for eye pain, flashes and floaters    Treatments tried: no treatments              Comments     Here for AMD both eyes. Vision is stable since last visit. Does not use   any eye drops. No flashes or floaters. No pain.    Brian Gonzalez COT 11:09 AM October 5, 2022               Review of systems for the eyes was negative other than the pertinent positives/negatives listed in the HPI.      Assessment & Plan      Aminta Cortés is a 82 year old female with the following diagnoses:   1. 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 PCO (posterior capsular opacification), bilateral    5. Chalazion of right upper eyelid       Doing well overall  S/P Eylea x many right eye (Stable with 10 week intervals)  Left eye s/p #3/3 today at Q4 week interval  Last injection right eye 9/4/2022 both eyes      Looks good in both eyes today   Extend to Q5week left eye, back to Q10 right eye    AREDS and Amsler to continue     Start warm compresses for right upper lid chalazion  Return precautions reviewed        Patient disposition:   Return in about 1 week (around 10/12/2022) for VT only, Eylea LEFT EYE.           Attending Physician Attestation:  Complete documentation of historical and exam elements from today's encounter can be found in the full encounter summary report (not reduplicated in this progress note).  I personally obtained the chief complaint(s) and history of present illness.  I confirmed and edited as necessary the review of systems, past medical/surgical history, family history, social history, and examination findings as documented by others; and I  examined the patient myself.  I personally reviewed the relevant tests, images, and reports as documented above.  I formulated and edited as necessary the assessment and plan and discussed the findings and management plan with the patient and family. . - Tano Mcduffie MD

## 2022-10-05 NOTE — NURSING NOTE
Chief Complaints and History of Present Illnesses   Patient presents with     Macular Degeneration Follow Up     Chief Complaint(s) and History of Present Illness(es)     Macular Degeneration Follow Up     Laterality: both eyes    Course: stable    Associated symptoms: Negative for eye pain, flashes and floaters    Treatments tried: no treatments              Comments     Here for AMD both eyes. Vision is stable since last visit. Does not use any eye drops. No flashes or floaters. No pain.    Brian HILLMAN 11:09 AM October 5, 2022

## 2022-10-12 ENCOUNTER — OFFICE VISIT (OUTPATIENT)
Dept: OPHTHALMOLOGY | Facility: CLINIC | Age: 82
End: 2022-10-12
Attending: OPHTHALMOLOGY
Payer: COMMERCIAL

## 2022-10-12 DIAGNOSIS — H35.3221 EXUDATIVE AGE-RELATED MACULAR DEGENERATION OF LEFT EYE WITH ACTIVE CHOROIDAL NEOVASCULARIZATION (H): Primary | ICD-10-CM

## 2022-10-12 PROCEDURE — 250N000011 HC RX IP 250 OP 636: Performed by: OPHTHALMOLOGY

## 2022-10-12 PROCEDURE — G0463 HOSPITAL OUTPT CLINIC VISIT: HCPCS

## 2022-10-12 PROCEDURE — 67028 INJECTION EYE DRUG: CPT | Mod: LT | Performed by: OPHTHALMOLOGY

## 2022-10-12 RX ADMIN — AFLIBERCEPT 2 MG: 40 INJECTION, SOLUTION INTRAVITREAL at 11:23

## 2022-10-12 ASSESSMENT — REFRACTION_WEARINGRX
OS_AXIS: 0045
OD_CYLINDER: +0.50
OS_SPHERE: -1.25
OD_ADD: +3.00
OD_SPHERE: -0.50
OS_CYLINDER: +0.75
OD_AXIS: 163
OS_ADD: +3.00
SPECS_TYPE: PAL

## 2022-10-12 ASSESSMENT — CONF VISUAL FIELD
OS_SUPERIOR_NASAL_RESTRICTION: 0
OS_INFERIOR_NASAL_RESTRICTION: 0
OD_NORMAL: 1
OD_INFERIOR_TEMPORAL_RESTRICTION: 0
OD_SUPERIOR_TEMPORAL_RESTRICTION: 0
OS_SUPERIOR_TEMPORAL_RESTRICTION: 0
OS_INFERIOR_TEMPORAL_RESTRICTION: 0
OD_SUPERIOR_NASAL_RESTRICTION: 0
OS_NORMAL: 1
METHOD: COUNTING FINGERS
OD_INFERIOR_NASAL_RESTRICTION: 0

## 2022-10-12 ASSESSMENT — VISUAL ACUITY
CORRECTION_TYPE: GLASSES
OD_CC: 20/30
OS_CC+: -2
OS_CC: 20/50
METHOD: SNELLEN - LINEAR
OD_CC+: +2
OD_CC: 20/20-2

## 2022-10-12 ASSESSMENT — TONOMETRY
OS_IOP_MMHG: 11
OD_IOP_MMHG: 10
IOP_METHOD: TONOPEN

## 2022-10-12 NOTE — PROGRESS NOTES
Chief Complaint(s) and History of Present Illness(es)     Macular Degeneration Follow Up            Laterality: both eyes    Comments: One week follow up.  AMD follow up.           Comments    Pts vision has been stable over the last week.  No flashes or floaters.  No eye pain.  No red or dry eyes.    CAPO VILLALOBOS October 12, 2022 10:35 AM                Review of systems for the eyes was negative other than the pertinent positives/negatives listed in the HPI.      Assessment & Plan      Aminta Cortés is a 82 year old female with the following diagnoses:   1. Exudative age-related macular degeneration of left eye with active choroidal neovascularization (H)           Here today to start Q5week interval left eye injections (#1/3)  Last injection right eye 9/4/2022 both eyes   RBA to Eylea injection discussed, consent obtained, will proceed today.   MANNY and Autumn to continue   Return precautions reviewed         Patient disposition:   Return in about 5 weeks (around 11/16/2022) for VT only; Eylea both eyes.           Attending Physician Attestation:  Complete documentation of historical and exam elements from today's encounter can be found in the full encounter summary report (not reduplicated in this progress note).  I personally obtained the chief complaint(s) and history of present illness.  I confirmed and edited as necessary the review of systems, past medical/surgical history, family history, social history, and examination findings as documented by others; and I examined the patient myself.  I personally reviewed the relevant tests, images, and reports as documented above.  I formulated and edited as necessary the assessment and plan and discussed the findings and management plan with the patient and family. . - Tano Mcduffie MD

## 2022-10-12 NOTE — NURSING NOTE
Chief Complaints and History of Present Illnesses   Patient presents with     Macular Degeneration Follow Up     One week follow up.  AMD follow up.      Chief Complaint(s) and History of Present Illness(es)     Macular Degeneration Follow Up            Laterality: both eyes    Comments: One week follow up.  AMD follow up.           Comments    Pts vision has been stable over the last week.  No flashes or floaters.  No eye pain.  No red or dry eyes.    CAPO VILLALOBOS October 12, 2022 10:35 AM

## 2022-10-29 ENCOUNTER — HEALTH MAINTENANCE LETTER (OUTPATIENT)
Age: 82
End: 2022-10-29

## 2022-11-16 ENCOUNTER — TELEPHONE (OUTPATIENT)
Dept: OPHTHALMOLOGY | Facility: CLINIC | Age: 82
End: 2022-11-16

## 2022-11-16 ENCOUNTER — OFFICE VISIT (OUTPATIENT)
Dept: OPHTHALMOLOGY | Facility: CLINIC | Age: 82
End: 2022-11-16
Attending: OPHTHALMOLOGY
Payer: COMMERCIAL

## 2022-11-16 DIAGNOSIS — H35.3221 EXUDATIVE AGE-RELATED MACULAR DEGENERATION OF LEFT EYE WITH ACTIVE CHOROIDAL NEOVASCULARIZATION (H): Primary | ICD-10-CM

## 2022-11-16 DIAGNOSIS — H35.3211 EXUDATIVE AGE-RELATED MACULAR DEGENERATION OF RIGHT EYE WITH ACTIVE CHOROIDAL NEOVASCULARIZATION (H): ICD-10-CM

## 2022-11-16 PROCEDURE — 67028 INJECTION EYE DRUG: CPT | Mod: RT | Performed by: OPHTHALMOLOGY

## 2022-11-16 PROCEDURE — 250N000011 HC RX IP 250 OP 636: Performed by: OPHTHALMOLOGY

## 2022-11-16 PROCEDURE — 67028 INJECTION EYE DRUG: CPT | Mod: LT | Performed by: OPHTHALMOLOGY

## 2022-11-16 RX ADMIN — AFLIBERCEPT 2 MG: 40 INJECTION, SOLUTION INTRAVITREAL at 11:19

## 2022-11-16 RX ADMIN — AFLIBERCEPT 2 MG: 40 INJECTION, SOLUTION INTRAVITREAL at 11:20

## 2022-11-16 ASSESSMENT — CONF VISUAL FIELD
OS_NORMAL: 1
OD_INFERIOR_TEMPORAL_RESTRICTION: 0
OS_SUPERIOR_NASAL_RESTRICTION: 0
OD_SUPERIOR_NASAL_RESTRICTION: 0
OS_SUPERIOR_TEMPORAL_RESTRICTION: 0
OD_INFERIOR_NASAL_RESTRICTION: 0
OS_INFERIOR_NASAL_RESTRICTION: 0
OD_SUPERIOR_TEMPORAL_RESTRICTION: 0
OS_INFERIOR_TEMPORAL_RESTRICTION: 0
OD_NORMAL: 1

## 2022-11-16 ASSESSMENT — TONOMETRY
OD_IOP_MMHG: 7
IOP_METHOD: TONOPEN
OS_IOP_MMHG: 12

## 2022-11-16 ASSESSMENT — VISUAL ACUITY
OD_SC: 20/40
OS_SC+: -2
METHOD: SNELLEN - LINEAR
OS_SC: 20/50

## 2022-11-16 NOTE — TELEPHONE ENCOUNTER
Bilateral redness, tearing and burning sensation following bilateral injections    Tetracaine used this visit instead of viscous lidocaine    Reviewed with Dr. Mcduffie    Reviewed with patient to use frequent preservative free tears and may use cool tears to see if helps    May try Refresh PM or Systane lubricating eye ointment also to see if provided more comfort.    Reviewed to try and keep eyes closed/rest if more comfortable and may use cool compresses for comfort    Reviewed if still having symptoms tomorrow to contact clinic for exam.    Pt/ verbally demonstrated understanding and seemed comfortable with plan      Clifford Klein RN 5:17 PM 11/16/22                [facility] Call Center    Phone Message    May a detailed message be left on voicemail: yes     Reason for Call: Other: Pt called and said that she is in a lot of pain after her injections today. she said that she can't open her eyes and thinks she may be having a reaction to the numbing agent that was used. Writer advised going to the ED if the pain continues or gets worse. Please contact pt to discuss.     Thank you     Action Taken: Message routed to:  [facility] ([facility] eye    Travel Screening: Not Applicable

## 2022-11-16 NOTE — PROGRESS NOTES
Chief Complaint(s) and History of Present Illness(es)    Eyelea injection - patient would prefer not to use lido gel if at all   possible. Vision seems to be creeping worse - but hard to tell from one   month to the next. No pain or discomfort.     GRISELDA Hernandez  10:43 AM       Review of systems for the eyes was negative other than the pertinent positives/negatives listed in the HPI.      Assessment & Plan      Aminta Cortés is a 82 year old female with the following diagnoses:   1. 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   AREDS and Amsler to continue   Return precautions reviewed        Patient disposition:   Return in about 5 weeks (around 12/21/2022) for VT only; Eylea left eye.           Attending Physician Attestation:  Complete documentation of historical and exam elements from today's encounter can be found in the full encounter summary report (not reduplicated in this progress note).  I personally obtained the chief complaint(s) and history of present illness.  I confirmed and edited as necessary the review of systems, past medical/surgical history, family history, social history, and examination findings as documented by others; and I examined the patient myself.  I personally reviewed the relevant tests, images, and reports as documented above.  I formulated and edited as necessary the assessment and plan and discussed the findings and management plan with the patient and family. . - Tano Mcduffie MD

## 2022-11-17 ENCOUNTER — TELEPHONE (OUTPATIENT)
Dept: OPHTHALMOLOGY | Facility: CLINIC | Age: 82
End: 2022-11-17

## 2022-12-28 ENCOUNTER — OFFICE VISIT (OUTPATIENT)
Dept: OPHTHALMOLOGY | Facility: CLINIC | Age: 82
End: 2022-12-28
Attending: OPHTHALMOLOGY
Payer: COMMERCIAL

## 2022-12-28 DIAGNOSIS — H35.3221 EXUDATIVE AGE-RELATED MACULAR DEGENERATION OF LEFT EYE WITH ACTIVE CHOROIDAL NEOVASCULARIZATION (H): Primary | ICD-10-CM

## 2022-12-28 PROCEDURE — 250N000011 HC RX IP 250 OP 636: Performed by: OPHTHALMOLOGY

## 2022-12-28 PROCEDURE — 999N000103 HC STATISTIC NO CHARGE FACILITY FEE

## 2022-12-28 PROCEDURE — 67028 INJECTION EYE DRUG: CPT | Mod: LT | Performed by: OPHTHALMOLOGY

## 2022-12-28 RX ADMIN — AFLIBERCEPT 2 MG: 40 INJECTION, SOLUTION INTRAVITREAL at 10:53

## 2022-12-28 ASSESSMENT — VISUAL ACUITY
OD_PH_SC: 20/30
OD_SC: 20/40
METHOD: SNELLEN - LINEAR
OS_SC+: +1
OS_SC: 20/60

## 2022-12-28 ASSESSMENT — TONOMETRY
OD_IOP_MMHG: 12
IOP_METHOD: TONOPEN
OS_IOP_MMHG: 12

## 2022-12-28 NOTE — PROGRESS NOTES
Chief Complaint(s) and History of Present Illness(es)     Macular Degeneration Follow Up            Laterality: both eyes    Onset: 6 weeks ago          Comments    Pt. States that she is doing well. No change in VA BE. No pain BE. No   flashes or floaters BE.   Joselyn Centeno COT 9:52 AM December 28, 2022                Review of systems for the eyes was negative other than the pertinent positives/negatives listed in the HPI.      Assessment & Plan      Aminta Cortés is a 82 year old female with the following diagnoses:   1. Exudative age-related macular degeneration of left eye with active choroidal neovascularization (H)       Here today for bilateral Eylea   Q5week interval left eye injections (#3/3)  [id number] interval right eye (last injection 11/16/2022)     RBA to Eylea injection left eye discussed, consent obtained, will proceed today.   AREDS and Autumn to continue   Return precautions reviewed         Patient disposition:   Return in about 5 weeks (around 2/1/2023) for DFE, OCT Macula.           Attending Physician Attestation:  Complete documentation of historical and exam elements from today's encounter can be found in the full encounter summary report (not reduplicated in this progress note).  I personally obtained the chief complaint(s) and history of present illness.  I confirmed and edited as necessary the review of systems, past medical/surgical history, family history, social history, and examination findings as documented by others; and I examined the patient myself.  I personally reviewed the relevant tests, images, and reports as documented above.  I formulated and edited as necessary the assessment and plan and discussed the findings and management plan with the patient and family. . - Tano Mcduffie MD

## 2022-12-28 NOTE — NURSING NOTE
Chief Complaints and History of Present Illnesses   Patient presents with     Macular Degeneration Follow Up     Chief Complaint(s) and History of Present Illness(es)     Macular Degeneration Follow Up            Laterality: both eyes    Onset: 6 weeks ago          Comments    Pt. States that she is doing well. No change in VA BE. No pain BE. No flashes or floaters BE.   Joselyn Centeno COT 9:52 AM December 28, 2022

## 2023-01-31 DIAGNOSIS — H35.3211 EXUDATIVE AGE-RELATED MACULAR DEGENERATION OF RIGHT EYE WITH ACTIVE CHOROIDAL NEOVASCULARIZATION (H): ICD-10-CM

## 2023-01-31 DIAGNOSIS — H35.3221 EXUDATIVE AGE-RELATED MACULAR DEGENERATION OF LEFT EYE WITH ACTIVE CHOROIDAL NEOVASCULARIZATION (H): Primary | ICD-10-CM

## 2023-02-01 ENCOUNTER — OFFICE VISIT (OUTPATIENT)
Dept: OPHTHALMOLOGY | Facility: CLINIC | Age: 83
End: 2023-02-01
Attending: OPHTHALMOLOGY
Payer: COMMERCIAL

## 2023-02-01 DIAGNOSIS — H35.3211 EXUDATIVE AGE-RELATED MACULAR DEGENERATION OF RIGHT EYE WITH ACTIVE CHOROIDAL NEOVASCULARIZATION (H): ICD-10-CM

## 2023-02-01 DIAGNOSIS — H26.493 PCO (POSTERIOR CAPSULAR OPACIFICATION), BILATERAL: Primary | ICD-10-CM

## 2023-02-01 DIAGNOSIS — H35.3221 EXUDATIVE AGE-RELATED MACULAR DEGENERATION OF LEFT EYE WITH ACTIVE CHOROIDAL NEOVASCULARIZATION (H): ICD-10-CM

## 2023-02-01 PROCEDURE — 67028 INJECTION EYE DRUG: CPT | Mod: LT | Performed by: OPHTHALMOLOGY

## 2023-02-01 PROCEDURE — 66821 AFTER CATARACT LASER SURGERY: CPT | Mod: RT | Performed by: OPHTHALMOLOGY

## 2023-02-01 PROCEDURE — 250N000009 HC RX 250: Performed by: OPHTHALMOLOGY

## 2023-02-01 PROCEDURE — 67028 INJECTION EYE DRUG: CPT | Mod: 50 | Performed by: OPHTHALMOLOGY

## 2023-02-01 PROCEDURE — 250N000011 HC RX IP 250 OP 636: Performed by: OPHTHALMOLOGY

## 2023-02-01 PROCEDURE — G0463 HOSPITAL OUTPT CLINIC VISIT: HCPCS | Mod: 25

## 2023-02-01 PROCEDURE — 92134 CPTRZ OPH DX IMG PST SGM RTA: CPT | Performed by: OPHTHALMOLOGY

## 2023-02-01 RX ADMIN — AFLIBERCEPT 2 MG: 40 INJECTION, SOLUTION INTRAVITREAL at 11:29

## 2023-02-01 RX ADMIN — AFLIBERCEPT 2 MG: 40 INJECTION, SOLUTION INTRAVITREAL at 11:30

## 2023-02-01 RX ADMIN — APRACLONIDINE HYDROCHLORIDE 1 DROP: 10 SOLUTION/ DROPS OPHTHALMIC at 11:28

## 2023-02-01 ASSESSMENT — REFRACTION_WEARINGRX
OS_ADD: +3.00
OD_ADD: +3.00
OD_CYLINDER: +0.50
OS_AXIS: 010
OS_CYLINDER: +0.75
SPECS_TYPE: BIFOCAL
OD_AXIS: 170
OD_SPHERE: -0.50
OS_SPHERE: -1.25

## 2023-02-01 ASSESSMENT — CONF VISUAL FIELD
OD_INFERIOR_TEMPORAL_RESTRICTION: 0
OD_SUPERIOR_NASAL_RESTRICTION: 0
OS_NORMAL: 1
OD_NORMAL: 1
OD_INFERIOR_NASAL_RESTRICTION: 0
OD_SUPERIOR_TEMPORAL_RESTRICTION: 0
OS_SUPERIOR_NASAL_RESTRICTION: 0
OS_INFERIOR_NASAL_RESTRICTION: 0
OS_SUPERIOR_TEMPORAL_RESTRICTION: 0
OS_INFERIOR_TEMPORAL_RESTRICTION: 0
METHOD: COUNTING FINGERS

## 2023-02-01 ASSESSMENT — CUP TO DISC RATIO
OD_RATIO: 0.2
OS_RATIO: 0.2

## 2023-02-01 ASSESSMENT — VISUAL ACUITY
CORRECTION_TYPE: GLASSES
OD_SC: 20/25-3
METHOD: SNELLEN - LINEAR
OS_CC: 20/60+
OS_SC: 20/50+2

## 2023-02-01 ASSESSMENT — TONOMETRY
OS_IOP_MMHG: 15
OD_IOP_MMHG: 12
IOP_METHOD: TONOPEN

## 2023-02-01 ASSESSMENT — EXTERNAL EXAM - RIGHT EYE: OD_EXAM: NORMAL

## 2023-02-01 ASSESSMENT — EXTERNAL EXAM - LEFT EYE: OS_EXAM: NORMAL

## 2023-02-01 ASSESSMENT — SLIT LAMP EXAM - LIDS
COMMENTS: NORMAL
COMMENTS: NORMAL

## 2023-02-01 NOTE — PROGRESS NOTES
Chief Complaint(s) and History of Present Illness(es)     Macular Degeneration Follow Up            Laterality: both eyes    Onset: months ago    Location: central vision    Frequency: constantly    Course: stable    Associated symptoms: Negative for flashes and floaters    Pain scale: 0/10          Comments    Pt here for follow up AMD.  No new changes or concerns since her visit   12/28/22.    She still struggles to read, which is not new.  Denies   flashes/floaters/pain.    No ocular meds    RAFY Leroy 10:16 AM 02/01/2023                 Review of systems for the eyes was negative other than the pertinent positives/negatives listed in the HPI.      Assessment & Plan      Aminta Niurka Cortés is a 83 year old female with the following diagnoses:   1. PCO (posterior capsular opacification), bilateral    2. 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 Exudative age-related macular degeneration of right eye with active choroidal neovascularization (H)         Here for Dilated fundus exam both eyes  Feels vision is overall stable.  Some increased difficulty reading which has been gradual  S/P Eylea both eyes x many  Recently extended to [id number] right eye, Q5 weeks left eye   Last injection right eye = 11/16/2022  Last injection left eye = 12/28/2022    OCT mac without fluid in either eye today  VISUALLY SIGNIFICANT posterior capsular opacity (PCO) now right eye   RBA to YAG capsulotomy discussed, consent obtained, will proceed today.   Return precautions reviewed     Extend to Q6week left eye, Q12 right eye    AREDS and Amsler to continue  RBA to Eylea in both eyes today discussed, consent obtained.        Patient disposition:   Return in about 6 weeks (around 3/15/2023) for VT only, OCT Macula, Eylea LEFT.            Attending Physician Attestation:  Complete documentation of historical and exam elements from today's encounter can be found in the  full encounter summary report (not reduplicated in this progress note).  I personally obtained the chief complaint(s) and history of present illness.  I confirmed and edited as necessary the review of systems, past medical/surgical history, family history, social history, and examination findings as documented by others; and I examined the patient myself.  I personally reviewed the relevant tests, images, and reports as documented above.  I formulated and edited as necessary the assessment and plan and discussed the findings and management plan with the patient and family. . - Tano Mcduffie MD

## 2023-03-13 DIAGNOSIS — H35.3221 EXUDATIVE AGE-RELATED MACULAR DEGENERATION OF LEFT EYE WITH ACTIVE CHOROIDAL NEOVASCULARIZATION (H): Primary | ICD-10-CM

## 2023-03-13 DIAGNOSIS — H35.3211 EXUDATIVE AGE-RELATED MACULAR DEGENERATION OF RIGHT EYE WITH ACTIVE CHOROIDAL NEOVASCULARIZATION (H): ICD-10-CM

## 2023-03-15 ENCOUNTER — OFFICE VISIT (OUTPATIENT)
Dept: OPHTHALMOLOGY | Facility: CLINIC | Age: 83
End: 2023-03-15
Attending: OPHTHALMOLOGY
Payer: COMMERCIAL

## 2023-03-15 DIAGNOSIS — Z96.1 PSEUDOPHAKIA OF BOTH EYES: ICD-10-CM

## 2023-03-15 DIAGNOSIS — H35.3221 EXUDATIVE AGE-RELATED MACULAR DEGENERATION OF LEFT EYE WITH ACTIVE CHOROIDAL NEOVASCULARIZATION (H): Primary | ICD-10-CM

## 2023-03-15 DIAGNOSIS — H43.393 FLOATERS IN VISUAL FIELD, BILATERAL: ICD-10-CM

## 2023-03-15 DIAGNOSIS — H35.3211 EXUDATIVE AGE-RELATED MACULAR DEGENERATION OF RIGHT EYE WITH ACTIVE CHOROIDAL NEOVASCULARIZATION (H): ICD-10-CM

## 2023-03-15 PROCEDURE — 67028 INJECTION EYE DRUG: CPT | Mod: LT | Performed by: OPHTHALMOLOGY

## 2023-03-15 PROCEDURE — G0463 HOSPITAL OUTPT CLINIC VISIT: HCPCS | Mod: 25 | Performed by: OPHTHALMOLOGY

## 2023-03-15 PROCEDURE — 250N000011 HC RX IP 250 OP 636: Performed by: OPHTHALMOLOGY

## 2023-03-15 RX ADMIN — AFLIBERCEPT 2 MG: 40 INJECTION, SOLUTION INTRAVITREAL at 11:02

## 2023-03-15 ASSESSMENT — EXTERNAL EXAM - RIGHT EYE: OD_EXAM: NORMAL

## 2023-03-15 ASSESSMENT — VISUAL ACUITY
OD_SC+: -3
OS_SC: 20/60
METHOD: SNELLEN - LINEAR
OD_SC: 20/25
OS_SC+: -1

## 2023-03-15 ASSESSMENT — CONF VISUAL FIELD
OD_SUPERIOR_TEMPORAL_RESTRICTION: 0
OS_SUPERIOR_TEMPORAL_RESTRICTION: 0
OS_SUPERIOR_NASAL_RESTRICTION: 0
METHOD: COUNTING FINGERS
OS_INFERIOR_TEMPORAL_RESTRICTION: 0
OD_INFERIOR_NASAL_RESTRICTION: 0
OD_INFERIOR_TEMPORAL_RESTRICTION: 0
OS_INFERIOR_NASAL_RESTRICTION: 0
OD_SUPERIOR_NASAL_RESTRICTION: 0
OS_NORMAL: 1
OD_NORMAL: 1

## 2023-03-15 ASSESSMENT — EXTERNAL EXAM - LEFT EYE: OS_EXAM: NORMAL

## 2023-03-15 ASSESSMENT — CUP TO DISC RATIO: OS_RATIO: 0.2

## 2023-03-15 ASSESSMENT — SLIT LAMP EXAM - LIDS
COMMENTS: NORMAL
COMMENTS: NORMAL

## 2023-03-15 ASSESSMENT — TONOMETRY
IOP_METHOD: ICARE
OD_IOP_MMHG: 12
OS_IOP_MMHG: 13

## 2023-03-15 NOTE — NURSING NOTE
Chief Complaints and History of Present Illnesses   Patient presents with     Macular Degeneration Follow Up     Chief Complaint(s) and History of Present Illness(es)     Macular Degeneration Follow Up            Laterality: both eyes    Associated symptoms: floaters.  Negative for eye pain and flashes    Treatments tried: no treatments          Comments    Here for macular degeneration. Vision is about the same since last visit. Notes new floater in left eye - shadow-like string. No eye pain.     Brian HILLMAN 10:27 AM March 15, 2023

## 2023-03-15 NOTE — PROGRESS NOTES
Chief Complaint(s) and History of Present Illness(es)     Macular Degeneration Follow Up            Laterality: both eyes    Associated symptoms: floaters.  Negative for eye pain and flashes    Treatments tried: no treatments          Comments    Here for macular degeneration. Vision is about the same since last visit.   Notes new floater in left eye - shadow-like string. No eye pain.     Brian Gonzalez COT 10:27 AM March 15, 2023                Review of systems for the eyes was negative other than the pertinent positives/negatives listed in the HPI.      Assessment & Plan      Aminta Cortés is a 83 year old female with the following diagnoses:   1. 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 Floaters in visual field, bilateral       Noted change in floaters left eye for about 1 week.  No flashes  Dilated fundus exam stable.  No Adame.  Longstanding posterior vitreous detachment (PVD)   Discussed symptoms of retinal tear/detachment and the need to be seen urgently should they occur     S/P Eylea both eyes x many  Recently extended to Q12 weeks right eye, Q6 weeks left eye   Last injection both eyes 2/1/2023    Happy with improved vision s/p YAG right eye   RBA to Eylea left eye discussed, consent obtained, will proceed today.      Continue Eylea Q6week left eye, Q12 right eye    AREDS and Autumn to continue        Patient disposition:   Return in about 6 weeks (around 4/26/2023) for VT only, OCT Macula, Eylea both eyes.           Attending Physician Attestation:  Complete documentation of historical and exam elements from today's encounter can be found in the full encounter summary report (not reduplicated in this progress note).  I personally obtained the chief complaint(s) and history of present illness.   I confirmed and edited as necessary the review of systems, past medical/surgical history, family history, social history, and examination findings as documented by others; and I examined the patient myself.  I personally reviewed the relevant tests, images, and reports as documented above.  I formulated and edited as necessary the assessment and plan and discussed the findings and management plan with the patient and family. . - Tano Mcduffie MD

## 2023-04-26 ENCOUNTER — OFFICE VISIT (OUTPATIENT)
Dept: OPHTHALMOLOGY | Facility: CLINIC | Age: 83
End: 2023-04-26
Attending: OPHTHALMOLOGY
Payer: COMMERCIAL

## 2023-04-26 DIAGNOSIS — H35.3221 EXUDATIVE AGE-RELATED MACULAR DEGENERATION OF LEFT EYE WITH ACTIVE CHOROIDAL NEOVASCULARIZATION (H): ICD-10-CM

## 2023-04-26 DIAGNOSIS — H35.3211 EXUDATIVE AGE-RELATED MACULAR DEGENERATION OF RIGHT EYE WITH ACTIVE CHOROIDAL NEOVASCULARIZATION (H): ICD-10-CM

## 2023-04-26 PROCEDURE — 99213 OFFICE O/P EST LOW 20 MIN: CPT | Mod: 24 | Performed by: OPHTHALMOLOGY

## 2023-04-26 PROCEDURE — G0463 HOSPITAL OUTPT CLINIC VISIT: HCPCS | Performed by: OPHTHALMOLOGY

## 2023-04-26 PROCEDURE — 92134 CPTRZ OPH DX IMG PST SGM RTA: CPT | Performed by: OPHTHALMOLOGY

## 2023-04-26 ASSESSMENT — CONF VISUAL FIELD
OS_INFERIOR_NASAL_RESTRICTION: 0
OS_SUPERIOR_NASAL_RESTRICTION: 0
METHOD: COUNTING FINGERS
OD_INFERIOR_NASAL_RESTRICTION: 0
OS_INFERIOR_TEMPORAL_RESTRICTION: 0
OS_SUPERIOR_TEMPORAL_RESTRICTION: 0
OD_SUPERIOR_NASAL_RESTRICTION: 0
OS_NORMAL: 1
OD_INFERIOR_TEMPORAL_RESTRICTION: 0
OD_SUPERIOR_TEMPORAL_RESTRICTION: 0
OD_NORMAL: 1

## 2023-04-26 ASSESSMENT — TONOMETRY
OS_IOP_MMHG: 10
OD_IOP_MMHG: 11
IOP_METHOD: ICARE

## 2023-04-26 ASSESSMENT — VISUAL ACUITY
OD_PH_SC: 20/25
OS_SC+: -1
OS_SC: 20/60
OD_SC+: -2
OD_PH_SC+: -1
METHOD: SNELLEN - LINEAR
OD_SC: 20/40

## 2023-04-26 ASSESSMENT — SLIT LAMP EXAM - LIDS
COMMENTS: NORMAL
COMMENTS: NORMAL

## 2023-04-26 ASSESSMENT — EXTERNAL EXAM - LEFT EYE: OS_EXAM: NORMAL

## 2023-04-26 ASSESSMENT — EXTERNAL EXAM - RIGHT EYE: OD_EXAM: NORMAL

## 2023-04-26 NOTE — NURSING NOTE
Chief Complaints and History of Present Illnesses   Patient presents with     Macular Degeneration Follow Up     Chief Complaint(s) and History of Present Illness(es)     Macular Degeneration Follow Up            Laterality: both eyes    Course: stable    Associated symptoms: Negative for eye pain, flashes and floaters    Treatments tried: no treatments          Comments    Here for macular degeneration. Vision is about the same. No flashes or floaters. No eye pain.    Brian HILLMAN 10:39 AM April 26, 2023

## 2023-04-26 NOTE — PROGRESS NOTES
Chief Complaint(s) and History of Present Illness(es)     Macular Degeneration Follow Up            Laterality: both eyes    Course: stable    Associated symptoms: Negative for eye pain, flashes and floaters    Treatments tried: no treatments          Comments    Here for macular degeneration. Vision is about the same. No flashes or   floaters. No eye pain.    Brian Gonzalez COT 10:39 AM April 26, 2023                Review of systems for the eyes was negative other than the pertinent positives/negatives listed in the HPI.      Assessment & Plan      Aminta Niurka Cortés is a 83 year old female with the following diagnoses:   1. 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 Exudative age-related macular degeneration of right eye with active choroidal neovascularization (H)            S/P Eylea both eyes x many  Have been doing Q12 weeks right eye, Q6 weeks left eye   Last injection right eye 2/1/2023; left eye 3/15/2023     Will try to extent Eylea to Q8week left eye, Q16 right eye    AREDS and Amsler to continue  Return precautions reviewed     Patient disposition:   Return in about 3 weeks (around 5/17/2023) for VT only, OCT Macula; Eylea both eyes.           Attending Physician Attestation:  Complete documentation of historical and exam elements from today's encounter can be found in the full encounter summary report (not reduplicated in this progress note).  I personally obtained the chief complaint(s) and history of present illness.  I confirmed and edited as necessary the review of systems, past medical/surgical history, family history, social history, and examination findings as documented by others; and I examined the patient myself.  I personally reviewed the relevant tests, images, and reports as documented above.  I formulated and edited as necessary the assessment and plan and discussed the findings and management plan with the patient and family. . -  Tano Mcduffie MD

## 2023-05-16 DIAGNOSIS — H35.3221 EXUDATIVE AGE-RELATED MACULAR DEGENERATION OF LEFT EYE WITH ACTIVE CHOROIDAL NEOVASCULARIZATION (H): Primary | ICD-10-CM

## 2023-05-16 DIAGNOSIS — H35.3211 EXUDATIVE AGE-RELATED MACULAR DEGENERATION OF RIGHT EYE WITH ACTIVE CHOROIDAL NEOVASCULARIZATION (H): ICD-10-CM

## 2023-05-17 ENCOUNTER — OFFICE VISIT (OUTPATIENT)
Dept: OPHTHALMOLOGY | Facility: CLINIC | Age: 83
End: 2023-05-17
Attending: OPHTHALMOLOGY
Payer: COMMERCIAL

## 2023-05-17 DIAGNOSIS — H35.3221 EXUDATIVE AGE-RELATED MACULAR DEGENERATION OF LEFT EYE WITH ACTIVE CHOROIDAL NEOVASCULARIZATION (H): ICD-10-CM

## 2023-05-17 DIAGNOSIS — H35.3211 EXUDATIVE AGE-RELATED MACULAR DEGENERATION OF RIGHT EYE WITH ACTIVE CHOROIDAL NEOVASCULARIZATION (H): Primary | ICD-10-CM

## 2023-05-17 DIAGNOSIS — Z96.1 PSEUDOPHAKIA OF BOTH EYES: ICD-10-CM

## 2023-05-17 PROCEDURE — 250N000011 HC RX IP 250 OP 636: Performed by: OPHTHALMOLOGY

## 2023-05-17 PROCEDURE — 67028 INJECTION EYE DRUG: CPT | Mod: RT | Performed by: OPHTHALMOLOGY

## 2023-05-17 PROCEDURE — 92134 CPTRZ OPH DX IMG PST SGM RTA: CPT | Performed by: OPHTHALMOLOGY

## 2023-05-17 PROCEDURE — 67028 INJECTION EYE DRUG: CPT | Mod: LT | Performed by: OPHTHALMOLOGY

## 2023-05-17 PROCEDURE — G0463 HOSPITAL OUTPT CLINIC VISIT: HCPCS | Mod: 25 | Performed by: OPHTHALMOLOGY

## 2023-05-17 RX ADMIN — AFLIBERCEPT 2 MG: 40 INJECTION, SOLUTION INTRAVITREAL at 10:35

## 2023-05-17 RX ADMIN — AFLIBERCEPT 2 MG: 40 INJECTION, SOLUTION INTRAVITREAL at 10:36

## 2023-05-17 ASSESSMENT — TONOMETRY
IOP_METHOD: TONOPEN
OD_IOP_MMHG: 15
OS_IOP_MMHG: 14

## 2023-05-17 ASSESSMENT — VISUAL ACUITY
OS_CC: 20/70
OD_CC: 20/25
CORRECTION_TYPE: GLASSES
OD_CC+: -3
METHOD: SNELLEN - LINEAR

## 2023-05-17 ASSESSMENT — REFRACTION_WEARINGRX
SPECS_TYPE: BIFOCAL
OD_CYLINDER: +0.50
OS_SPHERE: -1.25
OS_ADD: +3.00
OS_AXIS: 010
OD_AXIS: 170
OS_CYLINDER: +0.75
OD_ADD: +3.00
OD_SPHERE: -0.50

## 2023-05-17 ASSESSMENT — CONF VISUAL FIELD
OD_SUPERIOR_NASAL_RESTRICTION: 0
OD_SUPERIOR_TEMPORAL_RESTRICTION: 0
METHOD: COUNTING FINGERS
OS_NORMAL: 1
OS_INFERIOR_TEMPORAL_RESTRICTION: 0
OD_INFERIOR_TEMPORAL_RESTRICTION: 0
OS_SUPERIOR_TEMPORAL_RESTRICTION: 0
OS_SUPERIOR_NASAL_RESTRICTION: 0
OD_INFERIOR_NASAL_RESTRICTION: 0
OD_NORMAL: 1
OS_INFERIOR_NASAL_RESTRICTION: 0

## 2023-05-17 NOTE — PROGRESS NOTES
Chief Complaint(s) and History of Present Illness(es)     Macular Degeneration Follow Up            Laterality: both eyes    Course: stable    Associated symptoms: floaters.  Negative for eye pain and flashes    Treatments tried: no treatments          Comments    Here for AMD. No changes in vision. Stable floater without flashes. No eye   pain. No gtts.    Brian Gonzalez COT 9:55 AM May 17, 2023                Review of systems for the eyes was negative other than the pertinent positives/negatives listed in the HPI.      Assessment & Plan      Aminta Cortés is a 83 year old female with the following diagnoses:   1. Exudative age-related macular degeneration of right eye with active choroidal neovascularization (H)    2. 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  Minimal fluid in the temporal macula of the right eye    Will try to extent Eylea to Q8week left eye, Q16 right eye      RBA to bilateral Eylea discussed, consent obtained, will proceed today (5/17/2023)  AREDS and Autumn to continue  Return precautions reviewed       Patient disposition:   Return in about 8 weeks (around 7/12/2023) for VT only, OCT Macula.           Attending Physician Attestation:  Complete documentation of historical and exam elements from today's encounter can be found in the full encounter summary report (not reduplicated in this progress note).  I personally obtained the chief complaint(s) and history of present illness.  I confirmed and edited as necessary the review of systems, past medical/surgical history, family history, social history, and  examination findings as documented by others; and I examined the patient myself.  I personally reviewed the relevant tests, images, and reports as documented above.  I formulated and edited as necessary the assessment and plan and discussed the findings and management plan with the patient and family. . - Tano Mcduffie MD

## 2023-05-17 NOTE — NURSING NOTE
Chief Complaints and History of Present Illnesses   Patient presents with     Macular Degeneration Follow Up     Chief Complaint(s) and History of Present Illness(es)     Macular Degeneration Follow Up            Laterality: both eyes    Course: stable    Associated symptoms: floaters.  Negative for eye pain and flashes    Treatments tried: no treatments          Comments    Here for AMD. No changes in vision. Stable floater without flashes. No eye pain. No gtts.    Biran HILLMAN 9:55 AM May 17, 2023

## 2023-06-01 ENCOUNTER — HEALTH MAINTENANCE LETTER (OUTPATIENT)
Age: 83
End: 2023-06-01

## 2023-07-11 DIAGNOSIS — H35.3221 EXUDATIVE AGE-RELATED MACULAR DEGENERATION OF LEFT EYE WITH ACTIVE CHOROIDAL NEOVASCULARIZATION (H): ICD-10-CM

## 2023-07-11 DIAGNOSIS — H35.3211 EXUDATIVE AGE-RELATED MACULAR DEGENERATION OF RIGHT EYE WITH ACTIVE CHOROIDAL NEOVASCULARIZATION (H): Primary | ICD-10-CM

## 2023-07-12 ENCOUNTER — PATIENT OUTREACH (OUTPATIENT)
Dept: CARE COORDINATION | Facility: CLINIC | Age: 83
End: 2023-07-12

## 2023-07-12 ENCOUNTER — OFFICE VISIT (OUTPATIENT)
Dept: OPHTHALMOLOGY | Facility: CLINIC | Age: 83
End: 2023-07-12
Attending: OPHTHALMOLOGY
Payer: COMMERCIAL

## 2023-07-12 DIAGNOSIS — H35.3221 EXUDATIVE AGE-RELATED MACULAR DEGENERATION OF LEFT EYE WITH ACTIVE CHOROIDAL NEOVASCULARIZATION (H): ICD-10-CM

## 2023-07-12 DIAGNOSIS — H35.3211 EXUDATIVE AGE-RELATED MACULAR DEGENERATION OF RIGHT EYE WITH ACTIVE CHOROIDAL NEOVASCULARIZATION (H): Primary | ICD-10-CM

## 2023-07-12 PROCEDURE — G0463 HOSPITAL OUTPT CLINIC VISIT: HCPCS | Mod: 25 | Performed by: OPHTHALMOLOGY

## 2023-07-12 PROCEDURE — 250N000011 HC RX IP 250 OP 636: Mod: JZ | Performed by: OPHTHALMOLOGY

## 2023-07-12 PROCEDURE — 67028 INJECTION EYE DRUG: CPT | Mod: LT | Performed by: OPHTHALMOLOGY

## 2023-07-12 PROCEDURE — 92134 CPTRZ OPH DX IMG PST SGM RTA: CPT | Performed by: OPHTHALMOLOGY

## 2023-07-12 RX ADMIN — AFLIBERCEPT 2 MG: 40 INJECTION, SOLUTION INTRAVITREAL at 10:25

## 2023-07-12 ASSESSMENT — CONF VISUAL FIELD
OS_SUPERIOR_NASAL_RESTRICTION: 0
OS_SUPERIOR_TEMPORAL_RESTRICTION: 0
OD_INFERIOR_TEMPORAL_RESTRICTION: 0
OS_NORMAL: 1
OD_SUPERIOR_TEMPORAL_RESTRICTION: 0
OS_INFERIOR_TEMPORAL_RESTRICTION: 0
OD_NORMAL: 1
OS_INFERIOR_NASAL_RESTRICTION: 0
METHOD: COUNTING FINGERS
OD_SUPERIOR_NASAL_RESTRICTION: 0
OD_INFERIOR_NASAL_RESTRICTION: 0

## 2023-07-12 ASSESSMENT — EXTERNAL EXAM - LEFT EYE: OS_EXAM: NORMAL

## 2023-07-12 ASSESSMENT — SLIT LAMP EXAM - LIDS
COMMENTS: NORMAL
COMMENTS: NORMAL

## 2023-07-12 ASSESSMENT — TONOMETRY
IOP_METHOD: TONOPEN
OD_IOP_MMHG: 11
OS_IOP_MMHG: 11

## 2023-07-12 ASSESSMENT — VISUAL ACUITY
OS_SC: 20/60-2/+
OD_SC: 20/25-3

## 2023-07-12 ASSESSMENT — EXTERNAL EXAM - RIGHT EYE: OD_EXAM: NORMAL

## 2023-07-12 NOTE — PROGRESS NOTES
"Chief Complaint(s) and History of Present Illness(es)     Macular Degeneration Follow Up            Laterality: both eyes    Onset: months ago    Location: central vision    Course: stable    Associated symptoms: Negative for flashes and floaters    Pain scale: 0/10          Comments    She states she has never been told to look at an amsler grid, has not been   doing that but looks at a TV and seems to be stable per patient  No new concerns since LV - she states she is still seeing a \"line\" in the   vision of her left eye which she had reported two visits ago, it's   unchanged/     No ocular meds    Dia Abad, COT 9:31 AM 07/12/2023                 Review of systems for the eyes was negative other than the pertinent positives/negatives listed in the HPI.      Assessment & Plan      Aminta Cortés is a 83 year old female with the following diagnoses:   1. Exudative age-related macular degeneration of right eye with active choroidal neovascularization (H)    2. Exudative age-related macular degeneration of left eye with active choroidal neovascularization (H)         Here to reassess exudate age related macular degeneration in both eyes   Stable, no concerns  Attempting to further T&E both eyes (Q8 and Q16 weeks)  S/P Eylea both eyes x many  Last injection both eyes 5/17/2023  Previous frequency Q12 weeks right eye, Q6 weeks left eye   OCT mac looks good today.  No fluid in either eye  Continue extending Eylea to Q8week left eye, Q16 right eye      RBA to Left eye Eylea discussed, consent obtained, will proceed today   AREDS and Amsler to continue  Return precautions reviewed     Ms. Cortés is concerned that her  may not be able to drive her to visits in the future due to worsening health issues.  We discussed a social work consult to explore transportation resources.  If unable to find a solution, we may need to consider seeking more local care (Dr. Alvarez at [facility])      Patient " disposition:   Return in about 2 months (around 9/12/2023) for VT only, OCT Macula.  Due for dilation in Feb 2024         Attending Physician Attestation:  Complete documentation of historical and exam elements from today's encounter can be found in the full encounter summary report (not reduplicated in this progress note).  I personally obtained the chief complaint(s) and history of present illness.  I confirmed and edited as necessary the review of systems, past medical/surgical history, family history, social history, and examination findings as documented by others; and I examined the patient myself.  I personally reviewed the relevant tests, images, and reports as documented above.  I formulated and edited as necessary the assessment and plan and discussed the findings and management plan with the patient and family. . - Tano Mcduffie MD

## 2023-07-12 NOTE — PROGRESS NOTES
Social Work - Intervention  [facility]  Data/Intervention:    Patient Name: Aminta Cortés Goes By: Aminta    /Age: 1940 (83 year old)     Visit Type: telephone  Referral Source: Eye Clinic  Reason for Referral: Transportation    Collaborated With:    -Aminta and her  Ronaldo with her verbal permission- [phone]     Psychosocial Information/Concerns:  Referral received indicating Aminta is requesting information about transportation options.     Per chart review Aminta'  currently transports her to Eye Clinic appointments but she is concerned he may not be able to continuing doing so due to health issues.      Intervention/Education/Resources Provided:  I contacted Aminta and introduced myself and explained the reason for my call and ultimately she handed the phone to Ronaldo due to hearing difficulties.     Aminta did say though that she is wanting back up transportation options for in case Ronaldo is not able to transport her in the future.     Ronaldo explained that he has a sister and two children but they do not live locally to be able to help in this situation.     I explained that I am not aware of any transportation options that [employer] will pay for but explained there are transportation options but they will all have an out of pocket expense. I provided contact information for [employer] ([phone]) and advised that Ronaldo or Aminta contact them to see what options there are. I also explained [employer] and Aminta and Ronaldo discussed how Aminta typically drives herself to the grocery store etc but can't transport herself to these Eye Clinic appts because she gets injections and cannot drive afterwards. I explained I will coordinate with the Eye Clinic provider regarding completing the provider portion of a [employer] application and will have the application sent to Aminta for her to complete her portion and submit. Ronaldo and Aminta are agreeable to this and will let me know if  [employer] doesn't work and we will go from there.      Assessment/Plan:  I will coordinate with the Eye Clinic regarding completing the provider part of a [employer] application.      Provided patient/family with contact information and availability.    KWAN Alvarado, Health system    [facility]  Ph: [phone], Pgr: [phone]  7/12/2023

## 2023-07-27 ENCOUNTER — TELEPHONE (OUTPATIENT)
Dept: OPHTHALMOLOGY | Facility: CLINIC | Age: 83
End: 2023-07-27
Payer: COMMERCIAL

## 2023-09-07 ENCOUNTER — TELEPHONE (OUTPATIENT)
Dept: OPHTHALMOLOGY | Facility: CLINIC | Age: 83
End: 2023-09-07

## 2023-09-07 ENCOUNTER — OFFICE VISIT (OUTPATIENT)
Dept: OPHTHALMOLOGY | Facility: CLINIC | Age: 83
End: 2023-09-07
Attending: OPHTHALMOLOGY
Payer: COMMERCIAL

## 2023-09-07 DIAGNOSIS — H35.3211 EXUDATIVE AGE-RELATED MACULAR DEGENERATION OF RIGHT EYE WITH ACTIVE CHOROIDAL NEOVASCULARIZATION (H): Primary | ICD-10-CM

## 2023-09-07 DIAGNOSIS — H35.3222 EXUDATIVE AGE-RELATED MACULAR DEGENERATION OF LEFT EYE WITH INACTIVE CHOROIDAL NEOVASCULARIZATION (H): ICD-10-CM

## 2023-09-07 PROCEDURE — 67028 INJECTION EYE DRUG: CPT | Mod: LT | Performed by: OPHTHALMOLOGY

## 2023-09-07 PROCEDURE — G0463 HOSPITAL OUTPT CLINIC VISIT: HCPCS | Mod: 25 | Performed by: OPHTHALMOLOGY

## 2023-09-07 PROCEDURE — 99214 OFFICE O/P EST MOD 30 MIN: CPT | Mod: 25 | Performed by: OPHTHALMOLOGY

## 2023-09-07 PROCEDURE — 250N000011 HC RX IP 250 OP 636: Mod: JZ | Performed by: OPHTHALMOLOGY

## 2023-09-07 PROCEDURE — 92134 CPTRZ OPH DX IMG PST SGM RTA: CPT | Performed by: OPHTHALMOLOGY

## 2023-09-07 RX ADMIN — AFLIBERCEPT 2 MG: 40 INJECTION, SOLUTION INTRAVITREAL at 11:38

## 2023-09-07 ASSESSMENT — CONF VISUAL FIELD
OS_SUPERIOR_TEMPORAL_RESTRICTION: 0
OD_SUPERIOR_TEMPORAL_RESTRICTION: 0
OS_INFERIOR_TEMPORAL_RESTRICTION: 0
OD_INFERIOR_NASAL_RESTRICTION: 0
OS_NORMAL: 1
OD_INFERIOR_TEMPORAL_RESTRICTION: 0
OS_SUPERIOR_NASAL_RESTRICTION: 0
OD_NORMAL: 1
OS_INFERIOR_NASAL_RESTRICTION: 0
OD_SUPERIOR_NASAL_RESTRICTION: 0

## 2023-09-07 ASSESSMENT — VISUAL ACUITY
OD_PH_SC: 20/25
OD_PH_SC+: -2
OS_SC: 20/60
METHOD: SNELLEN - LINEAR
OS_SC+: -2
OD_SC: 20/30

## 2023-09-07 ASSESSMENT — TONOMETRY
OS_IOP_MMHG: 10
OD_IOP_MMHG: 10
IOP_METHOD: TONOPEN

## 2023-09-07 NOTE — TELEPHONE ENCOUNTER
[facility] Call Center    Phone Message    May a detailed message be left on voicemail: no     Reason for Call: Other: pt would like to talk to someone about scheduling her next injection in 8 wks on a Wednesday. With Dr Mcduffie. Pt states someone schedule her for 4 wks and it is to be 8wks.  Please contact pt to discuss Dr Mcduffie's request  Thank you!      Action Taken: Message routed to:  [facility]    Travel Screening: Not Applicable

## 2023-09-07 NOTE — PROGRESS NOTES
Chief Complaint(s) and History of Present Illness(es)     Macular Degeneration Follow Up            Laterality: both eyes    Onset: 8 weeks ago          Comments    Pt. States that she is doing well. No change in VA BE, No pain or dryness   BE. No flashes or floaters BE.   Joselyn Centeno COT 9:56 AM September 7, 2023                Review of systems for the eyes was negative other than the pertinent positives/negatives listed in the HPI.      Assessment & Plan      Aminta Cortés is a 83 year old female with the following diagnoses:   1. Exudative age-related macular degeneration of right eye with active choroidal neovascularization (H)    2. Exudative age-related macular degeneration of left eye with inactive choroidal neovascularization (H)         Here to reassess exudate age related macular degeneration in both eyes   Stable, no concerns  T&E both eyes (Q8 and Q16 weeks)  S/P Eylea both eyes x many  Last injection RIGHT eye 5/17/2023  Last injection LEFT eye 7/12/2023    OCT mac looks good right eye.  Minimal subretinal fluid left eye   Continue Eylea Q8week left eye  Monitor right eye for now     RBA to Left eye Eylea discussed, consent obtained, will proceed today   AREDS and Amsler to continue  Return precautions reviewed               Patient disposition:   Return in about 2 months (around 11/7/2023) for VT only, OCT Macula.           Attending Physician Attestation:  Complete documentation of historical and exam elements from today's encounter can be found in the full encounter summary report (not reduplicated in this progress note).  I personally obtained the chief complaint(s) and history of present illness.  I confirmed and edited as necessary the review of systems, past medical/surgical history, family history, social history, and examination findings as documented by others; and I examined the patient myself.  I personally reviewed the relevant tests, images, and reports as documented above.  I  formulated and edited as necessary the assessment and plan and discussed the findings and management plan with the patient and family. . - Tano Mcduffie MD

## 2023-09-07 NOTE — TELEPHONE ENCOUNTER
Called and spoke to Aminta     Scheduled her an appointment for nov 1st at 8 am with Dr. Reta Mathias Communication Facilitator on 9/7/2023 at 2:36 PM

## 2023-09-07 NOTE — NURSING NOTE
Chief Complaints and History of Present Illnesses   Patient presents with    Macular Degeneration Follow Up     Chief Complaint(s) and History of Present Illness(es)       Macular Degeneration Follow Up              Laterality: both eyes    Onset: 8 weeks ago              Comments    Pt. States that she is doing well. No change in VA BE, No pain or dryness BE. No flashes or floaters BE.   Joselyn Centeno COT 9:56 AM September 7, 2023

## 2023-10-30 DIAGNOSIS — H35.3211 EXUDATIVE AGE-RELATED MACULAR DEGENERATION OF RIGHT EYE WITH ACTIVE CHOROIDAL NEOVASCULARIZATION (H): Primary | ICD-10-CM

## 2023-11-01 ENCOUNTER — OFFICE VISIT (OUTPATIENT)
Dept: OPHTHALMOLOGY | Facility: CLINIC | Age: 83
End: 2023-11-01
Attending: OPHTHALMOLOGY
Payer: COMMERCIAL

## 2023-11-01 DIAGNOSIS — H35.3211 EXUDATIVE AGE-RELATED MACULAR DEGENERATION OF RIGHT EYE WITH ACTIVE CHOROIDAL NEOVASCULARIZATION (H): Primary | ICD-10-CM

## 2023-11-01 DIAGNOSIS — H35.3222 EXUDATIVE AGE-RELATED MACULAR DEGENERATION OF LEFT EYE WITH INACTIVE CHOROIDAL NEOVASCULARIZATION (H): ICD-10-CM

## 2023-11-01 PROCEDURE — G0463 HOSPITAL OUTPT CLINIC VISIT: HCPCS | Performed by: OPHTHALMOLOGY

## 2023-11-01 PROCEDURE — 92134 CPTRZ OPH DX IMG PST SGM RTA: CPT | Performed by: OPHTHALMOLOGY

## 2023-11-01 PROCEDURE — 99213 OFFICE O/P EST LOW 20 MIN: CPT | Performed by: OPHTHALMOLOGY

## 2023-11-01 ASSESSMENT — VISUAL ACUITY
OD_CC+: -2
METHOD: SNELLEN - LINEAR
OD_CC: 20/25
OS_CC: 20/50
OS_CC+: -2

## 2023-11-01 ASSESSMENT — EXTERNAL EXAM - RIGHT EYE: OD_EXAM: NORMAL

## 2023-11-01 ASSESSMENT — TONOMETRY
OS_IOP_MMHG: 10
IOP_METHOD: ICARE
OD_IOP_MMHG: 12

## 2023-11-01 ASSESSMENT — EXTERNAL EXAM - LEFT EYE: OS_EXAM: NORMAL

## 2023-11-01 ASSESSMENT — CONF VISUAL FIELD
OS_INFERIOR_NASAL_RESTRICTION: 0
OS_SUPERIOR_NASAL_RESTRICTION: 0
METHOD: COUNTING FINGERS
OS_INFERIOR_TEMPORAL_RESTRICTION: 0
OD_INFERIOR_NASAL_RESTRICTION: 0
OD_SUPERIOR_TEMPORAL_RESTRICTION: 0
OD_INFERIOR_TEMPORAL_RESTRICTION: 0
OS_NORMAL: 1
OS_SUPERIOR_TEMPORAL_RESTRICTION: 0
OD_SUPERIOR_NASAL_RESTRICTION: 0
OD_NORMAL: 1

## 2023-11-01 ASSESSMENT — REFRACTION_WEARINGRX
OS_CYLINDER: +0.75
OD_ADD: +3.00
OD_AXIS: 170
SPECS_TYPE: BIFOCAL
OD_CYLINDER: +0.50
OS_ADD: +3.00
OS_AXIS: 010
OD_SPHERE: -0.50
OS_SPHERE: -1.25

## 2023-11-01 ASSESSMENT — SLIT LAMP EXAM - LIDS
COMMENTS: NORMAL
COMMENTS: NORMAL

## 2023-11-01 NOTE — PROGRESS NOTES
Chief Complaint(s) and History of Present Illness(es)     Macular Degeneration Follow Up            Laterality: both eyes    Associated symptoms: Negative for eye pain, flashes and floaters          Comments    Here for AMD follow up. VA doing fine. No pain. No flashes or floaters.    Brian Gonzalez COT 8:10 AM November 1, 2023                Review of systems for the eyes was negative other than the pertinent positives/negatives listed in the HPI.      Assessment & Plan      Aminta Cortés is a 83 year old female with the following diagnoses:   1. Exudative age-related macular degeneration of right eye with active choroidal neovascularization (H)    2. Exudative age-related macular degeneration of left eye with inactive choroidal neovascularization (H)         Here to reassess exudate age related macular degeneration in both eyes   Stable, no concerns  T&E both eyes (Q16 weeks right eye and Q8 weeks left eye)  S/P Eylea both eyes x many  Last injection RIGHT eye 5/17/2023  Last injection LEFT eye 9/7/2023    Patient states vision has been stable since the last visit.  OCT mac stable right eye, better left eye   Will monitor right eye for now  Plan to extend left eye to Q12 week injections  Return precautions reviewed   Continue amsler/areds      Patient disposition:   Return in about 4 weeks (around 11/29/2023) for VT only, OCT Macula.           Attending Physician Attestation:  Complete documentation of historical and exam elements from today's encounter can be found in the full encounter summary report (not reduplicated in this progress note).  I personally obtained the chief complaint(s) and history of present illness.  I confirmed and edited as necessary the review of systems, past medical/surgical history, family history, social history, and examination findings as documented by others; and I examined the patient myself.  I personally reviewed the relevant tests, images, and reports as documented above.  I  formulated and edited as necessary the assessment and plan and discussed the findings and management plan with the patient and family. . - Tano Mcduffie MD

## 2023-11-01 NOTE — NURSING NOTE
Chief Complaints and History of Present Illnesses   Patient presents with    Macular Degeneration Follow Up     Chief Complaint(s) and History of Present Illness(es)       Macular Degeneration Follow Up              Laterality: both eyes    Associated symptoms: Negative for eye pain, flashes and floaters              Comments    Here for AMD follow up. VA doing fine. No pain. No flashes or floaters.    Brian HILLMAN 8:10 AM November 1, 2023

## 2023-11-02 NOTE — NURSING NOTE
14 Chief Complaints and History of Present Illnesses   Patient presents with     Exudative Macular Degeneration Follow Up     11 week follow up      Chief Complaint(s) and History of Present Illness(es)     Exudative Macular Degeneration Follow Up     Associated symptoms: Negative for floaters and flashes    Comments: 11 week follow up               Comments     Pt states vision is the same as last visit. No eye pain today. Pt notes slightly more distortion in LE.    Tyra RODRIGUEZ March 5, 2019 10:56 AM                   14 9 12 16 7 13 14 8 9 14 7 11 15 8 14 12 14 11

## 2023-11-29 ENCOUNTER — OFFICE VISIT (OUTPATIENT)
Dept: OPHTHALMOLOGY | Facility: CLINIC | Age: 83
End: 2023-11-29
Attending: OPHTHALMOLOGY
Payer: COMMERCIAL

## 2023-11-29 DIAGNOSIS — H35.3221 EXUDATIVE AGE-RELATED MACULAR DEGENERATION OF LEFT EYE WITH ACTIVE CHOROIDAL NEOVASCULARIZATION (H): ICD-10-CM

## 2023-11-29 DIAGNOSIS — H35.3222 EXUDATIVE AGE-RELATED MACULAR DEGENERATION OF LEFT EYE WITH INACTIVE CHOROIDAL NEOVASCULARIZATION (H): Primary | ICD-10-CM

## 2023-11-29 DIAGNOSIS — H35.3211 EXUDATIVE AGE-RELATED MACULAR DEGENERATION OF RIGHT EYE WITH ACTIVE CHOROIDAL NEOVASCULARIZATION (H): Primary | ICD-10-CM

## 2023-11-29 DIAGNOSIS — H35.3212: ICD-10-CM

## 2023-11-29 PROCEDURE — 92134 CPTRZ OPH DX IMG PST SGM RTA: CPT | Performed by: OPHTHALMOLOGY

## 2023-11-29 PROCEDURE — G0463 HOSPITAL OUTPT CLINIC VISIT: HCPCS | Mod: 25 | Performed by: OPHTHALMOLOGY

## 2023-11-29 PROCEDURE — 67028 INJECTION EYE DRUG: CPT | Mod: LT | Performed by: OPHTHALMOLOGY

## 2023-11-29 PROCEDURE — 92134 CPTRZ OPH DX IMG PST SGM RTA: CPT | Mod: 26 | Performed by: OPHTHALMOLOGY

## 2023-11-29 PROCEDURE — 99207 OCT RETINA SPECTRALIS OU (BOTH EYE): CPT | Mod: 26 | Performed by: OPHTHALMOLOGY

## 2023-11-29 PROCEDURE — 250N000011 HC RX IP 250 OP 636: Mod: JZ | Performed by: OPHTHALMOLOGY

## 2023-11-29 RX ADMIN — AFLIBERCEPT 2 MG: 40 INJECTION, SOLUTION INTRAVITREAL at 09:46

## 2023-11-29 ASSESSMENT — TONOMETRY
OS_IOP_MMHG: 13
IOP_METHOD: TONOPEN
OD_IOP_MMHG: 13

## 2023-11-29 ASSESSMENT — VISUAL ACUITY
OS_CC: 20/60
OD_CC: 20/30
METHOD: SNELLEN - LINEAR
CORRECTION_TYPE: GLASSES

## 2023-11-29 NOTE — NURSING NOTE
Chief Complaints and History of Present Illnesses   Patient presents with    Macular Degeneration Follow Up     Chief Complaint(s) and History of Present Illness(es)       Macular Degeneration Follow Up              Laterality: both eyes    Onset: 4 weeks ago              Comments    Pt. States that she is doing well. No pain BE. No change in VA BE. No flashes or floaters BE.   Joselyn Centeno COT 9:12 AM November 29, 2023

## 2023-11-29 NOTE — PROGRESS NOTES
Chief Complaint(s) and History of Present Illness(es)     Macular Degeneration Follow Up            Laterality: both eyes    Onset: 4 weeks ago          Comments    Pt. States that she is doing well. No pain BE. No change in VA BE. No   flashes or floaters BE.   Joselyn Centeno COT 9:12 AM November 29, 2023                Review of systems for the eyes was negative other than the pertinent positives/negatives listed in the HPI.      Assessment & Plan      Aminta Cortés is a 83 year old female with the following diagnoses:   1. 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 Exudative age-related macular degeneration, right eye, with inactive choroidal neovascularization (H)         Here to reassess exudate age related macular degeneration in both eyes   Stable, no concerns  T&E both eyes (Q16 weeks right eye and Q8 weeks left eye)  S/P Eylea both eyes x many  Last injection RIGHT eye 5/17/2023  Last injection LEFT eye 9/7/2023     Patient states vision has been stable since the last visit.  OCT mac stable again in both eyes     Will monitor right eye for now  Proceed with Q12 week injections left eye     RBA to Eylea left eye discussed, consent obtained, will proceed today.   Return precautions reviewed   Continue amsler/areds      Patient disposition:   Return in about 3 months (around 2/29/2024) for VT only, OCT Macula.           Attending Physician Attestation:  Complete documentation of historical and exam elements from today's encounter can be found in the full encounter summary report (not reduplicated in this progress note).  I personally obtained the chief complaint(s) and history of present illness.  I confirmed and edited as necessary the review of systems, past medical/surgical history, family history, social history, and examination  findings as documented by others; and I examined the patient myself.  I personally reviewed the relevant tests, images, and reports as documented above.  I formulated and edited as necessary the assessment and plan and discussed the findings and management plan with the patient and family. . - Tano Mcduffie MD

## 2024-02-21 ENCOUNTER — OFFICE VISIT (OUTPATIENT)
Dept: OPHTHALMOLOGY | Facility: CLINIC | Age: 84
End: 2024-02-21
Attending: OPHTHALMOLOGY
Payer: COMMERCIAL

## 2024-02-21 DIAGNOSIS — H35.3211 EXUDATIVE AGE-RELATED MACULAR DEGENERATION OF RIGHT EYE WITH ACTIVE CHOROIDAL NEOVASCULARIZATION (H): Primary | ICD-10-CM

## 2024-02-21 DIAGNOSIS — H04.123 BILATERAL DRY EYES: ICD-10-CM

## 2024-02-21 DIAGNOSIS — H35.3221 EXUDATIVE AGE-RELATED MACULAR DEGENERATION OF LEFT EYE WITH ACTIVE CHOROIDAL NEOVASCULARIZATION (H): Primary | ICD-10-CM

## 2024-02-21 DIAGNOSIS — H35.3212 EXUDATIVE AGE-RELATED MACULAR DEGENERATION OF RIGHT EYE WITH INACTIVE CHOROIDAL NEOVASCULARIZATION (H): ICD-10-CM

## 2024-02-21 DIAGNOSIS — Z96.1 PSEUDOPHAKIA OF BOTH EYES: ICD-10-CM

## 2024-02-21 PROCEDURE — 92134 CPTRZ OPH DX IMG PST SGM RTA: CPT | Performed by: OPHTHALMOLOGY

## 2024-02-21 PROCEDURE — 67028 INJECTION EYE DRUG: CPT | Mod: LT | Performed by: OPHTHALMOLOGY

## 2024-02-21 PROCEDURE — G0463 HOSPITAL OUTPT CLINIC VISIT: HCPCS | Mod: 25 | Performed by: OPHTHALMOLOGY

## 2024-02-21 PROCEDURE — 250N000011 HC RX IP 250 OP 636: Mod: JZ | Performed by: OPHTHALMOLOGY

## 2024-02-21 RX ADMIN — AFLIBERCEPT 2 MG: 40 INJECTION, SOLUTION INTRAVITREAL at 10:06

## 2024-02-21 ASSESSMENT — EXTERNAL EXAM - RIGHT EYE: OD_EXAM: NORMAL

## 2024-02-21 ASSESSMENT — REFRACTION_WEARINGRX
OS_ADD: +3.00
SPECS_TYPE: BIFOCAL
OD_SPHERE: -0.50
OD_AXIS: 170
OD_ADD: +3.00
OD_CYLINDER: +0.50
OS_SPHERE: -1.25
OS_AXIS: 010
OS_CYLINDER: +0.75

## 2024-02-21 ASSESSMENT — VISUAL ACUITY
METHOD: SNELLEN - LINEAR
CORRECTION_TYPE: GLASSES
OD_CC+: -2
OD_CC: 20/20
OS_CC: 20/50

## 2024-02-21 ASSESSMENT — TONOMETRY
OD_IOP_MMHG: 12
IOP_METHOD: TONOPEN
OS_IOP_MMHG: 13

## 2024-02-21 ASSESSMENT — EXTERNAL EXAM - LEFT EYE: OS_EXAM: NORMAL

## 2024-02-21 ASSESSMENT — SLIT LAMP EXAM - LIDS
COMMENTS: NORMAL
COMMENTS: NORMAL

## 2024-02-21 NOTE — NURSING NOTE
Chief Complaints and History of Present Illnesses   Patient presents with    Follow Up     Chief Complaint(s) and History of Present Illness(es)       Follow Up              Laterality: both eyes    Course: stable    Associated symptoms: Negative for dryness, flashes and floaters    Treatments tried: no treatments              Comments    Here for follow up. VA is about the same. Has been trying gala-berry supplement that she feels has helped with her dry eyes. No new flashes or floaters. No eye pain.    Brian Gonzalez COT 8:51 AM February 21, 2024

## 2024-02-21 NOTE — PROGRESS NOTES
Chief Complaint(s) and History of Present Illness(es)     Follow Up            Laterality: both eyes    Course: stable    Associated symptoms: Negative for dryness, flashes and floaters    Treatments tried: no treatments          Comments    Here for follow up. VA is about the same. Has been trying gala-berry   supplement that she feels has helped with her dry eyes. No new flashes or   floaters. No eye pain.    Brian Lisa COT 8:51 AM February 21, 2024           Review of systems for the eyes was negative other than the pertinent positives/negatives listed in the HPI.      Assessment & Plan      Aminta Niurka Cortés is a 84 year old female with the following diagnoses:   1. 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 Bilateral dry eyes           Here to reassess exudative age related macular degeneration in both eyes   Stable, no concerns.  Dry eye syndrome better with new supplement    T&E both eyes (Q16 weeks right eye and Q8 -> Q12 weeks left eye)  S/P Eylea both eyes x many  Last injection RIGHT eye 5/17/2023  Last injection LEFT eye 11/29/2023     OCT mac stable right eye.  Minimal intraretinal fluid left eye      Will monitor right eye for now  Proceed with Q12 week injections left eye      RBA to Eylea left eye discussed, consent obtained, will proceed today.   Return precautions reviewed   Continue amsler/areds          Patient disposition:   Return in about 3 months (around 5/21/2024) for DFE, OCT Macula.           Attending Physician Attestation:  Complete documentation of historical and exam elements from today's encounter can be found in the full encounter summary report (not reduplicated in this progress note).  I personally obtained the chief complaint(s) and history of present illness.  I  confirmed and edited as necessary the review of systems, past medical/surgical history, family history, social history, and examination findings as documented by others; and I examined the patient myself.  I personally reviewed the relevant tests, images, and reports as documented above.  I formulated and edited as necessary the assessment and plan and discussed the findings and management plan with the patient and family. . - Tano Mcduffie MD

## 2024-05-21 DIAGNOSIS — H35.3211 EXUDATIVE AGE-RELATED MACULAR DEGENERATION OF RIGHT EYE WITH ACTIVE CHOROIDAL NEOVASCULARIZATION (H): Primary | ICD-10-CM

## 2024-05-22 ENCOUNTER — OFFICE VISIT (OUTPATIENT)
Dept: OPHTHALMOLOGY | Facility: CLINIC | Age: 84
End: 2024-05-22
Attending: OPHTHALMOLOGY
Payer: COMMERCIAL

## 2024-05-22 DIAGNOSIS — Z96.1 PSEUDOPHAKIA, BOTH EYES: ICD-10-CM

## 2024-05-22 DIAGNOSIS — H35.3212 EXUDATIVE AGE-RELATED MACULAR DEGENERATION OF RIGHT EYE WITH INACTIVE CHOROIDAL NEOVASCULARIZATION (H): ICD-10-CM

## 2024-05-22 DIAGNOSIS — H04.123 BILATERAL DRY EYES: ICD-10-CM

## 2024-05-22 DIAGNOSIS — H35.3221 EXUDATIVE AGE-RELATED MACULAR DEGENERATION OF LEFT EYE WITH ACTIVE CHOROIDAL NEOVASCULARIZATION (H): Primary | ICD-10-CM

## 2024-05-22 PROCEDURE — 99214 OFFICE O/P EST MOD 30 MIN: CPT | Mod: 25 | Performed by: OPHTHALMOLOGY

## 2024-05-22 PROCEDURE — G0463 HOSPITAL OUTPT CLINIC VISIT: HCPCS | Mod: 25 | Performed by: OPHTHALMOLOGY

## 2024-05-22 PROCEDURE — 92134 CPTRZ OPH DX IMG PST SGM RTA: CPT | Performed by: OPHTHALMOLOGY

## 2024-05-22 PROCEDURE — 250N000011 HC RX IP 250 OP 636: Mod: JZ | Performed by: OPHTHALMOLOGY

## 2024-05-22 PROCEDURE — 67028 INJECTION EYE DRUG: CPT | Mod: LT | Performed by: OPHTHALMOLOGY

## 2024-05-22 RX ADMIN — AFLIBERCEPT 2 MG: 40 INJECTION, SOLUTION INTRAVITREAL at 10:53

## 2024-05-22 ASSESSMENT — REFRACTION_WEARINGRX
OD_SPHERE: -0.50
OS_AXIS: 010
OS_SPHERE: -1.25
OS_CYLINDER: +0.75
OD_AXIS: 170
OD_CYLINDER: +0.50
OS_ADD: +3.00
SPECS_TYPE: BIFOCAL
OD_ADD: +3.00

## 2024-05-22 ASSESSMENT — REFRACTION_MANIFEST
OD_SPHERE: -0.50
OS_CYLINDER: +1.00
OD_ADD: +2.75
OS_AXIS: 010
OS_SPHERE: -0.75
OD_AXIS: 170
OS_ADD: +2.75
OD_CYLINDER: +0.75

## 2024-05-22 ASSESSMENT — SLIT LAMP EXAM - LIDS
COMMENTS: NORMAL
COMMENTS: NORMAL

## 2024-05-22 ASSESSMENT — CUP TO DISC RATIO
OD_RATIO: 0.2
OS_RATIO: 0.2

## 2024-05-22 ASSESSMENT — TONOMETRY
OS_IOP_MMHG: 08
OD_IOP_MMHG: 07
IOP_METHOD: TONOPEN

## 2024-05-22 ASSESSMENT — EXTERNAL EXAM - LEFT EYE: OS_EXAM: NORMAL

## 2024-05-22 ASSESSMENT — VISUAL ACUITY
METHOD: SNELLEN - LINEAR
OD_SC: 20/25
OS_SC: 20/60

## 2024-05-22 ASSESSMENT — EXTERNAL EXAM - RIGHT EYE: OD_EXAM: NORMAL

## 2024-05-22 NOTE — NURSING NOTE
Chief Complaints and History of Present Illnesses   Patient presents with    Follow Up     2 month follow up Macular Degeneration   Pt reports no change since last visit  Bernie VILLALOBOS 9:45 AM May 22, 2024          Chief Complaint(s) and History of Present Illness(es)       Follow Up              Laterality: both eyes    Associated symptoms: flashes and floaters.  Negative for dryness, eye pain and pain with eye movement    Treatments tried: no treatments    Comments: 2 month follow up Macular Degeneration   Pt reports no change since last visit  Bernie VILLALOBOS 9:45 AM May 22, 2024

## 2024-05-22 NOTE — PROGRESS NOTES
HPI       Follow Up    In both eyes.  Associated symptoms include flashes and floaters.  Negative for dryness, eye pain and pain with eye movement.  Treatments tried include no treatments. Additional comments: 2 month follow up Macular Degeneration   Pt reports no change since last visit  Bernie Garcia COA 9:45 AM May 22, 2024               Last edited by Bernie Garcia on 5/22/2024  9:45 AM.          Review of systems for the eyes was negative other than the pertinent positives/negatives listed in the HPI.      Assessment & Plan      Aminta Cortés is a 84 year old female with the following diagnoses:   1. 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 Pseudophakia, both eyes    4. Bilateral dry eyes       Here for continued care of age related macular degeneration in both eyes   Stable, no concerns.  Dry eye syndrome better with new supplement     T&E both eyes (Q16 weeks right eye and Q12 weeks left eye)  S/P Eylea both eyes x many  Last injection RIGHT eye 5/17/2023  Last injection LEFT eye 2/21/2024     Dilated fundus exam stable today in both eyes   OCT mac stable right eye.  Minimal intraretinal fluid left eye      Continue monitoring right eye for now  Continue Q12 week injections left eye      RBA to Eylea left eye discussed, consent obtained, will proceed today.   Return precautions reviewed   Continue amsler/areds      Patient disposition:   Return in about 3 months (around 8/22/2024) for VT only, OCT Macula.           Attending Physician Attestation:  Complete documentation of historical and exam elements from today's encounter can be found in the full encounter summary report (not reduplicated in this progress note).  I personally obtained the chief complaint(s) and history of present illness.  I confirmed and edited as necessary the  review of systems, past medical/surgical history, family history, social history, and examination findings as documented by others; and I examined the patient myself.  I personally reviewed the relevant tests, images, and reports as documented above.  I formulated and edited as necessary the assessment and plan and discussed the findings and management plan with the patient and family. . - Tano Mcduffie MD

## 2024-06-08 ENCOUNTER — HEALTH MAINTENANCE LETTER (OUTPATIENT)
Age: 84
End: 2024-06-08

## 2024-08-10 DIAGNOSIS — H35.3212 EXUDATIVE AGE-RELATED MACULAR DEGENERATION OF RIGHT EYE WITH INACTIVE CHOROIDAL NEOVASCULARIZATION (H): Primary | ICD-10-CM

## 2024-08-14 ENCOUNTER — OFFICE VISIT (OUTPATIENT)
Dept: OPHTHALMOLOGY | Facility: CLINIC | Age: 84
End: 2024-08-14
Attending: OPHTHALMOLOGY
Payer: COMMERCIAL

## 2024-08-14 DIAGNOSIS — H35.3211 EXUDATIVE AGE-RELATED MACULAR DEGENERATION OF RIGHT EYE WITH ACTIVE CHOROIDAL NEOVASCULARIZATION (H): ICD-10-CM

## 2024-08-14 DIAGNOSIS — H35.3221 EXUDATIVE AGE-RELATED MACULAR DEGENERATION OF LEFT EYE WITH ACTIVE CHOROIDAL NEOVASCULARIZATION (H): Primary | ICD-10-CM

## 2024-08-14 PROCEDURE — 67028 INJECTION EYE DRUG: CPT | Mod: RT | Performed by: OPHTHALMOLOGY

## 2024-08-14 PROCEDURE — 250N000011 HC RX IP 250 OP 636: Mod: JZ | Performed by: OPHTHALMOLOGY

## 2024-08-14 PROCEDURE — 92134 CPTRZ OPH DX IMG PST SGM RTA: CPT | Performed by: OPHTHALMOLOGY

## 2024-08-14 PROCEDURE — 67028 INJECTION EYE DRUG: CPT | Mod: LT | Performed by: OPHTHALMOLOGY

## 2024-08-14 PROCEDURE — G0463 HOSPITAL OUTPT CLINIC VISIT: HCPCS | Mod: 25 | Performed by: OPHTHALMOLOGY

## 2024-08-14 RX ADMIN — AFLIBERCEPT 2 MG: 40 INJECTION, SOLUTION INTRAVITREAL at 11:58

## 2024-08-14 RX ADMIN — AFLIBERCEPT 2 MG: 40 INJECTION, SOLUTION INTRAVITREAL at 11:57

## 2024-08-14 ASSESSMENT — EXTERNAL EXAM - RIGHT EYE: OD_EXAM: NORMAL

## 2024-08-14 ASSESSMENT — TONOMETRY
OS_IOP_MMHG: 10
IOP_METHOD: TONOPEN
OD_IOP_MMHG: 10

## 2024-08-14 ASSESSMENT — CONF VISUAL FIELD
OS_SUPERIOR_TEMPORAL_RESTRICTION: 0
OD_INFERIOR_NASAL_RESTRICTION: 0
OS_NORMAL: 1
OD_NORMAL: 1
OS_INFERIOR_TEMPORAL_RESTRICTION: 0
OD_SUPERIOR_NASAL_RESTRICTION: 0
OS_SUPERIOR_NASAL_RESTRICTION: 0
OD_SUPERIOR_TEMPORAL_RESTRICTION: 0
OD_INFERIOR_TEMPORAL_RESTRICTION: 0
OS_INFERIOR_NASAL_RESTRICTION: 0

## 2024-08-14 ASSESSMENT — VISUAL ACUITY
METHOD: SNELLEN - LINEAR
OD_SC: 20/25
OS_SC+: -2
OS_SC: 20/40

## 2024-08-14 ASSESSMENT — EXTERNAL EXAM - LEFT EYE: OS_EXAM: NORMAL

## 2024-08-14 ASSESSMENT — SLIT LAMP EXAM - LIDS
COMMENTS: NORMAL
COMMENTS: NORMAL

## 2024-08-14 NOTE — NURSING NOTE
Patient states that left eye seems to be worse. Unable to read captions on tv, lines are wavy.   Seeing a [place] of lights in vision every day, pt unsure which eye it is in, noticed 2 weeks before seen last.     Daniela Serrano 10:02 AM

## 2024-08-14 NOTE — PROGRESS NOTES
HPI       Follow Up    In both eyes.  Associated symptoms include Negative for eye pain, flashes and floaters. Additional comments: Exudative age-related macular degeneration of right eye with active choroidal neovascularization             Comments    Patient states that left eye seems to be worse. Unable to read captions on tv, lines are wavy.   Seeing a "[employer] of lights in vision every day, pt unsure which eye it is in, noticed 2 weeks before seen last.     Daniela Serrano 10:02 AM            Last edited by Daniela Serrano on 8/14/2024 10:03 AM.          Review of systems for the eyes was negative other than the pertinent positives/negatives listed in the HPI.      Assessment & Plan      Aminta Cortés is a 84 year old female with the following diagnoses:   1. 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 Exudative age-related macular degeneration of right eye with active choroidal neovascularization (H)         Here for continued care of age related macular degeneration in both eyes   Stable, no concerns.  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  Minimal intraretinal fluid left eye as before  Plan for both eyes injection today  Continue Q12 week injections left eye   Monitor right eye closely  Return precautions reviewed     RBA to Eylea injections discussed, consent obtained, will proceed today.   Return precautions reviewed   Continue amsler/areds      Patient disposition:   Return in about 3 months (around 11/14/2024) for OCT Macula, VT only.           Attending Physician Attestation:  Complete documentation of historical and exam elements from today's encounter can be found in the full encounter summary  report (not reduplicated in this progress note).  I personally obtained the chief complaint(s) and history of present illness.  I confirmed and edited as necessary the review of systems, past medical/surgical history, family history, social history, and examination findings as documented by others; and I examined the patient myself.  I personally reviewed the relevant tests, images, and reports as documented above.  I formulated and edited as necessary the assessment and plan and discussed the findings and management plan with the patient and family. . - Tano Mcduffie MD

## 2024-11-13 ENCOUNTER — OFFICE VISIT (OUTPATIENT)
Dept: OPHTHALMOLOGY | Facility: CLINIC | Age: 84
End: 2024-11-13
Attending: OPHTHALMOLOGY
Payer: COMMERCIAL

## 2024-11-13 DIAGNOSIS — Z96.1 PSEUDOPHAKIA, BOTH EYES: ICD-10-CM

## 2024-11-13 DIAGNOSIS — H04.123 BILATERAL DRY EYES: ICD-10-CM

## 2024-11-13 DIAGNOSIS — H35.3212 EXUDATIVE AGE-RELATED MACULAR DEGENERATION OF RIGHT EYE WITH INACTIVE CHOROIDAL NEOVASCULARIZATION (H): ICD-10-CM

## 2024-11-13 DIAGNOSIS — H35.3221 EXUDATIVE AGE-RELATED MACULAR DEGENERATION OF LEFT EYE WITH ACTIVE CHOROIDAL NEOVASCULARIZATION (H): Primary | ICD-10-CM

## 2024-11-13 PROCEDURE — 92134 CPTRZ OPH DX IMG PST SGM RTA: CPT | Performed by: OPHTHALMOLOGY

## 2024-11-13 PROCEDURE — 67028 INJECTION EYE DRUG: CPT | Mod: LT | Performed by: OPHTHALMOLOGY

## 2024-11-13 PROCEDURE — 250N000011 HC RX IP 250 OP 636: Mod: JZ | Performed by: OPHTHALMOLOGY

## 2024-11-13 RX ADMIN — AFLIBERCEPT 2 MG: 40 INJECTION, SOLUTION INTRAVITREAL at 10:43

## 2024-11-13 ASSESSMENT — EXTERNAL EXAM - LEFT EYE: OS_EXAM: NORMAL

## 2024-11-13 ASSESSMENT — EXTERNAL EXAM - RIGHT EYE: OD_EXAM: NORMAL

## 2024-11-13 ASSESSMENT — SLIT LAMP EXAM - LIDS
COMMENTS: NORMAL
COMMENTS: NORMAL

## 2024-11-13 ASSESSMENT — VISUAL ACUITY
OS_SC+: -2
OS_SC: 20/50
OD_SC: 20/25
METHOD: SNELLEN - LINEAR
OD_SC+: -3

## 2024-11-13 ASSESSMENT — TONOMETRY
OD_IOP_MMHG: 11
IOP_METHOD: TONOPEN
OS_IOP_MMHG: 12

## 2024-11-13 NOTE — PROGRESS NOTES
HPI       Macular Degeneration Follow Up    In both eyes.  Since onset it is stable.  Associated symptoms include floaters.  Negative for eye pain and flashes.  Treatments tried include eye drops.             Comments    Here for AMD follow up. VA is about the same. No eye pain. Stable floaters without flashes. No pain.    Brian Gonzalez COT 8:55 AM November 13, 2024             Last edited by Brian Gonzalez on 11/13/2024  8:55 AM.          Review of systems for the eyes was negative other than the pertinent positives/negatives listed in the HPI.      Assessment & Plan      Aminta Cortés is a 84 year old female with the following diagnoses:   1. 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 Pseudophakia, both eyes    4. Bilateral dry eyes         Here for continued care of age related macular degeneration in both eyes   Stable, no concerns.  Dry eye syndrome better with new supplement     T&E both eyes (right eye as needed and Q12 weeks left eye)  S/P Eylea both eyes x many  Last injection RIGHT eye 8/14/2024  Last injection LEFT eye 8/14/2024     OCT mac with no fluid right eye.  Stable, very scant subretinal fluid left eye   RBA to Eylea left eye discussed, consent obtained, will proceed today.   Continue Q12 week injections left eye   Monitor right eye closely  Return precautions reviewed   Continue amsler/areds         Patient disposition:   Return in about 3 months (around 2/13/2025) for VT only, OCT Macula.           Attending Physician Attestation:  Complete documentation of historical and exam elements from today's encounter can be found in the full encounter summary report (not reduplicated in this progress note).  I personally obtained the chief complaint(s) and history of present illness.  I confirmed and edited as necessary the review of  systems, past medical/surgical history, family history, social history, and examination findings as documented by others; and I examined the patient myself.  I personally reviewed the relevant tests, images, and reports as documented above.  I formulated and edited as necessary the assessment and plan and discussed the findings and management plan with the patient and family. . - Tano Mcduffie MD

## 2025-02-05 ENCOUNTER — OFFICE VISIT (OUTPATIENT)
Dept: OPHTHALMOLOGY | Facility: CLINIC | Age: 85
End: 2025-02-05
Attending: OPHTHALMOLOGY
Payer: COMMERCIAL

## 2025-02-05 DIAGNOSIS — H35.3211 EXUDATIVE AGE-RELATED MACULAR DEGENERATION OF RIGHT EYE WITH ACTIVE CHOROIDAL NEOVASCULARIZATION (H): Primary | ICD-10-CM

## 2025-02-05 DIAGNOSIS — Z96.1 PSEUDOPHAKIA, BOTH EYES: ICD-10-CM

## 2025-02-05 DIAGNOSIS — H04.123 BILATERAL DRY EYES: ICD-10-CM

## 2025-02-05 DIAGNOSIS — H35.3212: ICD-10-CM

## 2025-02-05 PROCEDURE — 67028 INJECTION EYE DRUG: CPT | Mod: LT | Performed by: OPHTHALMOLOGY

## 2025-02-05 PROCEDURE — G0463 HOSPITAL OUTPT CLINIC VISIT: HCPCS | Performed by: OPHTHALMOLOGY

## 2025-02-05 PROCEDURE — 250N000011 HC RX IP 250 OP 636: Mod: JZ | Performed by: OPHTHALMOLOGY

## 2025-02-05 PROCEDURE — 92134 CPTRZ OPH DX IMG PST SGM RTA: CPT | Performed by: OPHTHALMOLOGY

## 2025-02-05 RX ADMIN — AFLIBERCEPT 2 MG: 40 INJECTION, SOLUTION INTRAVITREAL at 10:27

## 2025-02-05 ASSESSMENT — REFRACTION_WEARINGRX
OD_CYLINDER: +0.50
OS_ADD: +3.00
OS_CYLINDER: +0.75
OS_SPHERE: -1.25
OD_AXIS: 170
OD_SPHERE: -0.50
SPECS_TYPE: BIFOCAL
OS_AXIS: 010
OD_ADD: +3.00

## 2025-02-05 ASSESSMENT — VISUAL ACUITY
OD_CC+: -2
OD_CC: 20/30
OS_CC: 20/60
OS_CC+: -1
METHOD: SNELLEN - LINEAR
CORRECTION_TYPE: GLASSES

## 2025-02-05 ASSESSMENT — TONOMETRY
IOP_METHOD: TONOPEN
OD_IOP_MMHG: 11
OS_IOP_MMHG: 11

## 2025-02-05 ASSESSMENT — EXTERNAL EXAM - RIGHT EYE: OD_EXAM: NORMAL

## 2025-02-05 ASSESSMENT — EXTERNAL EXAM - LEFT EYE: OS_EXAM: NORMAL

## 2025-02-05 ASSESSMENT — SLIT LAMP EXAM - LIDS
COMMENTS: NORMAL
COMMENTS: NORMAL

## 2025-02-05 NOTE — PROGRESS NOTES
HPI       Follow Up    In both eyes.  Since onset it is stable.  Associated symptoms include flashes and floaters.  Negative for eye pain.  Treatments tried include artificial tears.             Comments    Here for follow up. VA is about the same. No eye pain. Some flashes and floaters. Finds flashes bothersome.     Brian Gonzalez COT 9:15 AM February 5, 2025             Last edited by Brian Gonzalez on 2/5/2025  9:15 AM.          Review of systems for the eyes was negative other than the pertinent positives/negatives listed in the HPI.      Assessment & Plan      Aminta Cortés is a 85 year old female with the following diagnoses:   1. Exudative age-related macular degeneration of right eye with active choroidal neovascularization (H)    2. Exudative age-related macular degeneration, right eye, with inactive choroidal neovascularization (H)    3. Pseudophakia, both eyes    4. Bilateral dry eyes         Here for continued care of age related macular degeneration in both eyes   Doing well since last visit     T&E both eyes (right eye as needed and Q12 weeks left eye)  S/P Eylea both eyes x many  Last injection RIGHT eye 8/14/2024  Last injection LEFT eye 11/13/2024     OCT mac with no fluid right eye.  Stable, very scant subretinal fluid left eye   RBA to Eylea left eye discussed, consent obtained, will proceed today.     Continue Q12 week injections left eye   Monitor right eye closely  Return precautions reviewed   Continue amsler/areds      Patient disposition:   Return in about 3 months (around 5/5/2025) for DFE, OCT Macula.           Attending Physician Attestation:  Complete documentation of historical and exam elements from today's encounter can be found in the full encounter summary report (not reduplicated in this progress note).  I personally obtained the chief complaint(s) and history of present illness.  I confirmed and edited as necessary the review of systems, past medical/surgical history, family history,  social history, and examination findings as documented by others; and I examined the patient myself.  I personally reviewed the relevant tests, images, and reports as documented above.  I formulated and edited as necessary the assessment and plan and discussed the findings and management plan with the patient and family. . - Tano Mcduffie MD

## 2025-04-30 ENCOUNTER — OFFICE VISIT (OUTPATIENT)
Dept: OPHTHALMOLOGY | Facility: CLINIC | Age: 85
End: 2025-04-30
Attending: OPHTHALMOLOGY
Payer: COMMERCIAL

## 2025-04-30 DIAGNOSIS — Z96.1 PSEUDOPHAKIA OF BOTH EYES: ICD-10-CM

## 2025-04-30 DIAGNOSIS — H35.3212 EXUDATIVE AGE-RELATED MACULAR DEGENERATION OF RIGHT EYE WITH INACTIVE CHOROIDAL NEOVASCULARIZATION (H): ICD-10-CM

## 2025-04-30 DIAGNOSIS — H04.123 BILATERAL DRY EYES: ICD-10-CM

## 2025-04-30 DIAGNOSIS — H35.3211 EXUDATIVE AGE-RELATED MACULAR DEGENERATION OF RIGHT EYE WITH ACTIVE CHOROIDAL NEOVASCULARIZATION (H): Primary | ICD-10-CM

## 2025-04-30 PROCEDURE — 250N000011 HC RX IP 250 OP 636: Mod: JZ | Performed by: OPHTHALMOLOGY

## 2025-04-30 PROCEDURE — 67028 INJECTION EYE DRUG: CPT | Mod: LT | Performed by: OPHTHALMOLOGY

## 2025-04-30 PROCEDURE — 92134 CPTRZ OPH DX IMG PST SGM RTA: CPT | Performed by: OPHTHALMOLOGY

## 2025-04-30 PROCEDURE — G0463 HOSPITAL OUTPT CLINIC VISIT: HCPCS | Performed by: OPHTHALMOLOGY

## 2025-04-30 RX ADMIN — AFLIBERCEPT 2 MG: 40 INJECTION, SOLUTION INTRAVITREAL at 10:14

## 2025-04-30 ASSESSMENT — EXTERNAL EXAM - LEFT EYE: OS_EXAM: NORMAL

## 2025-04-30 ASSESSMENT — VISUAL ACUITY
OD_SC: 20/30
METHOD: SNELLEN - LINEAR
OD_SC+: -2
OS_SC: 20/60
OS_SC+: -2

## 2025-04-30 ASSESSMENT — CUP TO DISC RATIO
OS_RATIO: 0.2
OD_RATIO: 0.2

## 2025-04-30 ASSESSMENT — TONOMETRY
OD_IOP_MMHG: 12
IOP_METHOD: TONOPEN
OS_IOP_MMHG: 10

## 2025-04-30 ASSESSMENT — EXTERNAL EXAM - RIGHT EYE: OD_EXAM: NORMAL

## 2025-04-30 ASSESSMENT — SLIT LAMP EXAM - LIDS
COMMENTS: NORMAL
COMMENTS: NORMAL

## 2025-04-30 NOTE — PROGRESS NOTES
HPI       Follow Up    In both eyes.  Context:  reading.  Since onset it is gradually worsening.  Associated symptoms include headache and flashes.  Negative for eye pain and floaters.  Treatments tried include artificial tears.             Comments    Here for follow up. VA has slightly decreased making it more difficult to read. Occasional flashes with HA. No floaters. No pain.    MICHAEL Taveras 9:25 AM April 30, 2025             Last edited by Brian Gonzalez COMT on 4/30/2025  9:25 AM.          Review of systems for the eyes was negative other than the pertinent positives/negatives listed in the HPI.      Assessment & Plan      Aminta Cortés is a 85 year old female with the following diagnoses:   1. Exudative age-related macular degeneration of right eye with active choroidal neovascularization (H)    2. 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 Bilateral dry eyes       Here for continued care of age related macular degeneration in both eyes   Stable, no concerns.  Dry eye syndrome better with new supplement     T&E both eyes (right eye as needed and Q12 weeks left eye)  S/P Eylea both eyes x many  Last injection RIGHT eye 8/14/2024  Last injection LEFT eye 2/5/2025     OCT mac with no fluid right eye.  Stable, very scant subretinal fluid left eye   Dilated fundus exam stable today in both eyes     RBA to Eylea left eye discussed, consent obtained, will proceed today.   Continue Q12 week injections left eye   Monitor right eye closely    Return precautions reviewed   Continue amsler/areds        Patient disposition:   Return in about 3 months (around 7/30/2025) for VT only, OCT Macula.           Attending Physician Attestation:  Complete documentation of historical and exam elements from today's encounter can be found in the full encounter summary report (not reduplicated in this progress note).  I  personally obtained the chief complaint(s) and history of present illness.  I confirmed and edited as necessary the review of systems, past medical/surgical history, family history, social history, and examination findings as documented by others; and I examined the patient myself.  I personally reviewed the relevant tests, images, and reports as documented above.  I formulated and edited as necessary the assessment and plan and discussed the findings and management plan with the patient and family. . - Tano Mcduffie MD

## 2025-06-15 ENCOUNTER — HEALTH MAINTENANCE LETTER (OUTPATIENT)
Age: 85
End: 2025-06-15

## 2025-07-30 ENCOUNTER — OFFICE VISIT (OUTPATIENT)
Dept: OPHTHALMOLOGY | Facility: CLINIC | Age: 85
End: 2025-07-30
Attending: OPHTHALMOLOGY
Payer: COMMERCIAL

## 2025-07-30 DIAGNOSIS — H35.3212: ICD-10-CM

## 2025-07-30 DIAGNOSIS — Z96.1 PSEUDOPHAKIA, BOTH EYES: ICD-10-CM

## 2025-07-30 DIAGNOSIS — H35.3221 EXUDATIVE AGE-RELATED MACULAR DEGENERATION OF LEFT EYE WITH ACTIVE CHOROIDAL NEOVASCULARIZATION (H): Primary | ICD-10-CM

## 2025-07-30 DIAGNOSIS — H04.123 BILATERAL DRY EYES: ICD-10-CM

## 2025-07-30 PROCEDURE — G0463 HOSPITAL OUTPT CLINIC VISIT: HCPCS | Performed by: OPHTHALMOLOGY

## 2025-07-30 PROCEDURE — 67028 INJECTION EYE DRUG: CPT | Mod: LT | Performed by: OPHTHALMOLOGY

## 2025-07-30 PROCEDURE — 250N000011 HC RX IP 250 OP 636: Mod: JZ | Performed by: OPHTHALMOLOGY

## 2025-07-30 PROCEDURE — 99214 OFFICE O/P EST MOD 30 MIN: CPT | Mod: 25 | Performed by: OPHTHALMOLOGY

## 2025-07-30 PROCEDURE — 92134 CPTRZ OPH DX IMG PST SGM RTA: CPT | Performed by: OPHTHALMOLOGY

## 2025-07-30 RX ADMIN — AFLIBERCEPT 2 MG: 40 INJECTION, SOLUTION INTRAVITREAL at 09:59

## 2025-07-30 ASSESSMENT — SLIT LAMP EXAM - LIDS
COMMENTS: NORMAL
COMMENTS: NORMAL

## 2025-07-30 ASSESSMENT — EXTERNAL EXAM - RIGHT EYE: OD_EXAM: NORMAL

## 2025-07-30 ASSESSMENT — VISUAL ACUITY
OS_SC: 20/60
OD_SC+: -3
OD_SC: 20/25
METHOD: SNELLEN - LINEAR

## 2025-07-30 ASSESSMENT — EXTERNAL EXAM - LEFT EYE: OS_EXAM: NORMAL

## 2025-07-30 ASSESSMENT — TONOMETRY
OD_IOP_MMHG: 10
IOP_METHOD: ICARE
OS_IOP_MMHG: 10

## 2025-07-30 NOTE — PROGRESS NOTES
HPI       Follow Up    In both eyes.  Since onset it is stable.  Associated symptoms include Negative for eye pain and floaters.  Treatments tried include no treatments.             Comments    Here for follow up. VA is about the same. No pain. No floaters.    MICHAEL Taveras 9:13 AM July 30, 2025             Last edited by Brian Gonzalez COMT on 7/30/2025  9:13 AM.          Review of systems for the eyes was negative other than the pertinent positives/negatives listed in the HPI.      Assessment & Plan      Aminta Cortés is a 85 year old female with the following diagnoses:   1. 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 Exudative age-related macular degeneration, right eye, with inactive choroidal neovascularization (H)    3. Pseudophakia, both eyes    4. Bilateral dry eyes        Here for continued care of age related macular degeneration in both eyes   Stable, no concerns.  Dry eye syndrome better with new supplement     T&E both eyes (right eye as needed and Q12 weeks left eye)  S/P Eylea both eyes x many  Last injection RIGHT eye 8/14/2024  Last injection LEFT eye 4/30/2025     OCT mac with no fluid right eye.  Stable, very scant subretinal fluid left eye   Dilated fundus exam stable today in both eyes      RBA to Eylea left eye discussed, consent obtained, will proceed today.   Continue Q12 week injections left eye   Monitor right eye closely  Return precautions reviewed   Continue amsler/areds      Patient disposition:   Return in about 3 months (around 10/30/2025) for VT only, OCT Macula.           Attending Physician Attestation:  Complete documentation of historical and exam elements from today's encounter can be found in the full encounter summary report (not reduplicated in this progress note).  I personally obtained the chief complaint(s) and history of present illness.  I confirmed and edited as necessary the review of systems, past  medical/surgical history, family history, social history, and examination findings as documented by others; and I examined the patient myself.  I personally reviewed the relevant tests, images, and reports as documented above.  I formulated and edited as necessary the assessment and plan and discussed the findings and management plan with the patient and family. . - Tano Mcduffie MD